# Patient Record
Sex: FEMALE | Race: WHITE | NOT HISPANIC OR LATINO | Employment: OTHER | URBAN - METROPOLITAN AREA
[De-identification: names, ages, dates, MRNs, and addresses within clinical notes are randomized per-mention and may not be internally consistent; named-entity substitution may affect disease eponyms.]

---

## 2017-08-11 ENCOUNTER — HOSPITAL ENCOUNTER (INPATIENT)
Facility: HOSPITAL | Age: 82
LOS: 1 days | Discharge: HOSPICE | DRG: 087 | End: 2017-08-12
Attending: EMERGENCY MEDICINE | Admitting: PSYCHIATRY & NEUROLOGY
Payer: MEDICARE

## 2017-08-11 DIAGNOSIS — S06.33AA CEREBRAL CONTUSION: Primary | ICD-10-CM

## 2017-08-11 DIAGNOSIS — S06.329A: ICD-10-CM
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
## 2017-08-11 PROBLEM — E11.9 TYPE 2 DIABETES MELLITUS WITHOUT COMPLICATION: Status: ACTIVE | Noted: 2017-08-11

## 2017-08-11 PROBLEM — I10 ESSENTIAL HYPERTENSION: Status: ACTIVE | Noted: 2017-08-11

## 2017-08-11 PROBLEM — I35.0 NONRHEUMATIC AORTIC VALVE STENOSIS: Status: ACTIVE | Noted: 2017-08-11

## 2017-08-11 PROBLEM — E07.9 THYROID DYSFUNCTION: Status: ACTIVE | Noted: 2017-08-11

## 2017-08-11 PROBLEM — F32.A DEPRESSION: Status: ACTIVE | Noted: 2017-08-11

## 2017-08-11 LAB
ABO + RH BLD: NORMAL
ALBUMIN SERPL BCP-MCNC: 3.1 G/DL
ALP SERPL-CCNC: 63 U/L
ALT SERPL W/O P-5'-P-CCNC: 8 U/L
ANION GAP SERPL CALC-SCNC: 14 MMOL/L
APTT BLDCRRT: <21 SEC
AST SERPL-CCNC: 18 U/L
BACTERIA #/AREA URNS AUTO: ABNORMAL /HPF
BASOPHILS # BLD AUTO: 0.04 K/UL
BASOPHILS NFR BLD: 0.5 %
BILIRUB SERPL-MCNC: 0.4 MG/DL
BILIRUB UR QL STRIP: NEGATIVE
BLD GP AB SCN CELLS X3 SERPL QL: NORMAL
BUN SERPL-MCNC: 20 MG/DL
CALCIUM SERPL-MCNC: 9.2 MG/DL
CHLORIDE SERPL-SCNC: 107 MMOL/L
CHOLEST/HDLC SERPL: 3.5 {RATIO}
CLARITY UR REFRACT.AUTO: ABNORMAL
CO2 SERPL-SCNC: 17 MMOL/L
COLOR UR AUTO: YELLOW
CREAT SERPL-MCNC: 1.5 MG/DL
DIFFERENTIAL METHOD: ABNORMAL
EOSINOPHIL # BLD AUTO: 0 K/UL
EOSINOPHIL NFR BLD: 0.1 %
ERYTHROCYTE [DISTWIDTH] IN BLOOD BY AUTOMATED COUNT: 14.2 %
EST. GFR  (AFRICAN AMERICAN): 34.6 ML/MIN/1.73 M^2
EST. GFR  (NON AFRICAN AMERICAN): 30 ML/MIN/1.73 M^2
GLUCOSE SERPL-MCNC: 183 MG/DL
GLUCOSE UR QL STRIP: ABNORMAL
HCT VFR BLD AUTO: 32.8 %
HDL/CHOLESTEROL RATIO: 28.7 %
HDLC SERPL-MCNC: 143 MG/DL
HDLC SERPL-MCNC: 41 MG/DL
HGB BLD-MCNC: 10.5 G/DL
HGB UR QL STRIP: ABNORMAL
HYALINE CASTS UR QL AUTO: 3 /LPF
INR PPP: 1
KETONES UR QL STRIP: ABNORMAL
LDLC SERPL CALC-MCNC: 86.2 MG/DL
LEUKOCYTE ESTERASE UR QL STRIP: ABNORMAL
LYMPHOCYTES # BLD AUTO: 0.8 K/UL
LYMPHOCYTES NFR BLD: 9.8 %
MCH RBC QN AUTO: 28.8 PG
MCHC RBC AUTO-ENTMCNC: 32 G/DL
MCV RBC AUTO: 90 FL
MICROSCOPIC COMMENT: ABNORMAL
MONOCYTES # BLD AUTO: 0.4 K/UL
MONOCYTES NFR BLD: 4.3 %
NEUTROPHILS # BLD AUTO: 7.3 K/UL
NEUTROPHILS NFR BLD: 85 %
NITRITE UR QL STRIP: NEGATIVE
NONHDLC SERPL-MCNC: 102 MG/DL
PH UR STRIP: 8 [PH] (ref 5–8)
PLATELET # BLD AUTO: 188 K/UL
PMV BLD AUTO: 10.7 FL
POCT GLUCOSE: 219 MG/DL (ref 70–110)
POTASSIUM SERPL-SCNC: 4.9 MMOL/L
PROT SERPL-MCNC: 7.3 G/DL
PROT UR QL STRIP: ABNORMAL
PROTHROMBIN TIME: 10.9 SEC
RBC # BLD AUTO: 3.65 M/UL
RBC #/AREA URNS AUTO: 4 /HPF (ref 0–4)
SODIUM SERPL-SCNC: 138 MMOL/L
SP GR UR STRIP: 1.01 (ref 1–1.03)
SQUAMOUS #/AREA URNS AUTO: 1 /HPF
T4 FREE SERPL-MCNC: 1.3 NG/DL
TRIGL SERPL-MCNC: 79 MG/DL
TSH SERPL DL<=0.005 MIU/L-ACNC: 0.21 UIU/ML
URN SPEC COLLECT METH UR: ABNORMAL
UROBILINOGEN UR STRIP-ACNC: NEGATIVE EU/DL
WBC # BLD AUTO: 8.59 K/UL
WBC #/AREA URNS AUTO: >100 /HPF (ref 0–5)

## 2017-08-11 PROCEDURE — 12000002 HC ACUTE/MED SURGE SEMI-PRIVATE ROOM

## 2017-08-11 PROCEDURE — 25000003 PHARM REV CODE 250: Performed by: PSYCHIATRY & NEUROLOGY

## 2017-08-11 PROCEDURE — 93005 ELECTROCARDIOGRAM TRACING: CPT

## 2017-08-11 PROCEDURE — 81001 URINALYSIS AUTO W/SCOPE: CPT

## 2017-08-11 PROCEDURE — 85730 THROMBOPLASTIN TIME PARTIAL: CPT

## 2017-08-11 PROCEDURE — 25000003 PHARM REV CODE 250: Performed by: PHYSICIAN ASSISTANT

## 2017-08-11 PROCEDURE — 99223 1ST HOSP IP/OBS HIGH 75: CPT | Mod: AI,GC,, | Performed by: PSYCHIATRY & NEUROLOGY

## 2017-08-11 PROCEDURE — 96365 THER/PROPH/DIAG IV INF INIT: CPT | Mod: XS

## 2017-08-11 PROCEDURE — 84439 ASSAY OF FREE THYROXINE: CPT

## 2017-08-11 PROCEDURE — 63600175 PHARM REV CODE 636 W HCPCS: Performed by: PSYCHIATRY & NEUROLOGY

## 2017-08-11 PROCEDURE — 63600175 PHARM REV CODE 636 W HCPCS: Performed by: PHYSICIAN ASSISTANT

## 2017-08-11 PROCEDURE — 85025 COMPLETE CBC W/AUTO DIFF WBC: CPT

## 2017-08-11 PROCEDURE — 86900 BLOOD TYPING SEROLOGIC ABO: CPT

## 2017-08-11 PROCEDURE — 83036 HEMOGLOBIN GLYCOSYLATED A1C: CPT

## 2017-08-11 PROCEDURE — 85610 PROTHROMBIN TIME: CPT

## 2017-08-11 PROCEDURE — 96372 THER/PROPH/DIAG INJ SC/IM: CPT

## 2017-08-11 PROCEDURE — 99285 EMERGENCY DEPT VISIT HI MDM: CPT | Mod: 25

## 2017-08-11 PROCEDURE — 93010 ELECTROCARDIOGRAM REPORT: CPT | Mod: ,,, | Performed by: INTERNAL MEDICINE

## 2017-08-11 PROCEDURE — 87086 URINE CULTURE/COLONY COUNT: CPT

## 2017-08-11 PROCEDURE — 96368 THER/DIAG CONCURRENT INF: CPT

## 2017-08-11 PROCEDURE — A4216 STERILE WATER/SALINE, 10 ML: HCPCS | Performed by: PSYCHIATRY & NEUROLOGY

## 2017-08-11 PROCEDURE — 96375 TX/PRO/DX INJ NEW DRUG ADDON: CPT | Mod: XS

## 2017-08-11 PROCEDURE — 80053 COMPREHEN METABOLIC PANEL: CPT

## 2017-08-11 PROCEDURE — 99223 1ST HOSP IP/OBS HIGH 75: CPT | Mod: ,,, | Performed by: PHYSICIAN ASSISTANT

## 2017-08-11 PROCEDURE — 80061 LIPID PANEL: CPT

## 2017-08-11 PROCEDURE — 96366 THER/PROPH/DIAG IV INF ADDON: CPT

## 2017-08-11 PROCEDURE — 82962 GLUCOSE BLOOD TEST: CPT

## 2017-08-11 PROCEDURE — 86901 BLOOD TYPING SEROLOGIC RH(D): CPT

## 2017-08-11 PROCEDURE — 84443 ASSAY THYROID STIM HORMONE: CPT

## 2017-08-11 PROCEDURE — 99291 CRITICAL CARE FIRST HOUR: CPT | Mod: ,,, | Performed by: EMERGENCY MEDICINE

## 2017-08-11 RX ORDER — INSULIN GLARGINE 100 [IU]/ML
25 INJECTION, SOLUTION SUBCUTANEOUS NIGHTLY
COMMUNITY

## 2017-08-11 RX ORDER — HYDROCODONE BITARTRATE AND ACETAMINOPHEN 5; 325 MG/1; MG/1
1 TABLET ORAL EVERY 6 HOURS PRN
COMMUNITY

## 2017-08-11 RX ORDER — FUROSEMIDE 20 MG/1
20 TABLET ORAL EVERY OTHER DAY
Status: DISCONTINUED | OUTPATIENT
Start: 2017-08-12 | End: 2017-08-12 | Stop reason: HOSPADM

## 2017-08-11 RX ORDER — METOPROLOL TARTRATE 50 MG/1
50 TABLET ORAL 2 TIMES DAILY
Status: DISCONTINUED | OUTPATIENT
Start: 2017-08-11 | End: 2017-08-12 | Stop reason: HOSPADM

## 2017-08-11 RX ORDER — NICARDIPINE HYDROCHLORIDE 0.2 MG/ML
5 INJECTION INTRAVENOUS CONTINUOUS
Status: DISCONTINUED | OUTPATIENT
Start: 2017-08-11 | End: 2017-08-12

## 2017-08-11 RX ORDER — SODIUM CHLORIDE 0.9 % (FLUSH) 0.9 %
3 SYRINGE (ML) INJECTION EVERY 8 HOURS
Status: DISCONTINUED | OUTPATIENT
Start: 2017-08-11 | End: 2017-08-12 | Stop reason: HOSPADM

## 2017-08-11 RX ORDER — FERROUS SULFATE 325(65) MG
325 TABLET ORAL
COMMUNITY

## 2017-08-11 RX ORDER — ONDANSETRON 2 MG/ML
4 INJECTION INTRAMUSCULAR; INTRAVENOUS
Status: COMPLETED | OUTPATIENT
Start: 2017-08-11 | End: 2017-08-11

## 2017-08-11 RX ORDER — FUROSEMIDE 20 MG/1
20 TABLET ORAL EVERY OTHER DAY
COMMUNITY

## 2017-08-11 RX ORDER — LABETALOL HYDROCHLORIDE 5 MG/ML
10 INJECTION, SOLUTION INTRAVENOUS EVERY 6 HOURS PRN
Status: DISCONTINUED | OUTPATIENT
Start: 2017-08-11 | End: 2017-08-12 | Stop reason: HOSPADM

## 2017-08-11 RX ORDER — CITALOPRAM 10 MG/1
10 TABLET ORAL DAILY
Status: DISCONTINUED | OUTPATIENT
Start: 2017-08-11 | End: 2017-08-12 | Stop reason: HOSPADM

## 2017-08-11 RX ORDER — ONDANSETRON 4 MG/1
4 TABLET, FILM COATED ORAL EVERY 8 HOURS PRN
COMMUNITY

## 2017-08-11 RX ORDER — HYDRALAZINE HYDROCHLORIDE 25 MG/1
25 TABLET, FILM COATED ORAL 3 TIMES DAILY
COMMUNITY

## 2017-08-11 RX ORDER — LOSARTAN POTASSIUM 100 MG/1
100 TABLET ORAL DAILY
COMMUNITY

## 2017-08-11 RX ORDER — PANTOPRAZOLE SODIUM 40 MG/1
40 TABLET, DELAYED RELEASE ORAL DAILY
Status: DISCONTINUED | OUTPATIENT
Start: 2017-08-11 | End: 2017-08-12 | Stop reason: HOSPADM

## 2017-08-11 RX ORDER — CITALOPRAM 10 MG/1
10 TABLET ORAL DAILY
COMMUNITY

## 2017-08-11 RX ORDER — NICARDIPINE HYDROCHLORIDE 0.2 MG/ML
5 INJECTION INTRAVENOUS CONTINUOUS
Status: DISCONTINUED | OUTPATIENT
Start: 2017-08-11 | End: 2017-08-11

## 2017-08-11 RX ORDER — LEVOTHYROXINE SODIUM 125 UG/1
125 TABLET ORAL DAILY
Status: ON HOLD | COMMUNITY
End: 2017-10-17 | Stop reason: HOSPADM

## 2017-08-11 RX ORDER — SIMVASTATIN 20 MG/1
20 TABLET, FILM COATED ORAL NIGHTLY
COMMUNITY

## 2017-08-11 RX ORDER — LEVETIRACETAM 10 MG/ML
1000 INJECTION INTRAVASCULAR ONCE
Status: COMPLETED | OUTPATIENT
Start: 2017-08-11 | End: 2017-08-11

## 2017-08-11 RX ORDER — AMOXICILLIN 250 MG
1 CAPSULE ORAL 2 TIMES DAILY
Status: DISCONTINUED | OUTPATIENT
Start: 2017-08-11 | End: 2017-08-12 | Stop reason: HOSPADM

## 2017-08-11 RX ORDER — HYDRALAZINE HYDROCHLORIDE 20 MG/ML
10 INJECTION INTRAMUSCULAR; INTRAVENOUS EVERY 8 HOURS PRN
Status: DISCONTINUED | OUTPATIENT
Start: 2017-08-11 | End: 2017-08-12

## 2017-08-11 RX ORDER — HYDRALAZINE HYDROCHLORIDE 25 MG/1
25 TABLET, FILM COATED ORAL 3 TIMES DAILY
Status: DISCONTINUED | OUTPATIENT
Start: 2017-08-11 | End: 2017-08-12 | Stop reason: HOSPADM

## 2017-08-11 RX ORDER — SIMVASTATIN 10 MG/1
20 TABLET, FILM COATED ORAL NIGHTLY
Status: DISCONTINUED | OUTPATIENT
Start: 2017-08-11 | End: 2017-08-12 | Stop reason: HOSPADM

## 2017-08-11 RX ORDER — GLUCAGON 1 MG
1 KIT INJECTION
Status: DISCONTINUED | OUTPATIENT
Start: 2017-08-11 | End: 2017-08-12 | Stop reason: HOSPADM

## 2017-08-11 RX ORDER — FERROUS SULFATE 325(65) MG
325 TABLET, DELAYED RELEASE (ENTERIC COATED) ORAL DAILY
Status: DISCONTINUED | OUTPATIENT
Start: 2017-08-11 | End: 2017-08-12 | Stop reason: HOSPADM

## 2017-08-11 RX ORDER — INSULIN ASPART 100 [IU]/ML
0-5 INJECTION, SOLUTION INTRAVENOUS; SUBCUTANEOUS EVERY 6 HOURS PRN
Status: DISCONTINUED | OUTPATIENT
Start: 2017-08-11 | End: 2017-08-12 | Stop reason: HOSPADM

## 2017-08-11 RX ORDER — LEVOTHYROXINE SODIUM 125 UG/1
125 TABLET ORAL DAILY
Status: DISCONTINUED | OUTPATIENT
Start: 2017-08-11 | End: 2017-08-12 | Stop reason: HOSPADM

## 2017-08-11 RX ORDER — OMEPRAZOLE 40 MG/1
40 CAPSULE, DELAYED RELEASE ORAL DAILY
COMMUNITY

## 2017-08-11 RX ORDER — HYDRALAZINE HYDROCHLORIDE 20 MG/ML
10 INJECTION INTRAMUSCULAR; INTRAVENOUS EVERY 8 HOURS PRN
Status: DISCONTINUED | OUTPATIENT
Start: 2017-08-11 | End: 2017-08-11

## 2017-08-11 RX ORDER — LOSARTAN POTASSIUM 50 MG/1
100 TABLET ORAL DAILY
Status: DISCONTINUED | OUTPATIENT
Start: 2017-08-11 | End: 2017-08-12 | Stop reason: HOSPADM

## 2017-08-11 RX ORDER — NITROFURANTOIN 25; 75 MG/1; MG/1
100 CAPSULE ORAL 2 TIMES DAILY
COMMUNITY
End: 2018-02-16

## 2017-08-11 RX ORDER — ONDANSETRON 2 MG/ML
4 INJECTION INTRAMUSCULAR; INTRAVENOUS EVERY 12 HOURS PRN
Status: DISCONTINUED | OUTPATIENT
Start: 2017-08-11 | End: 2017-08-12 | Stop reason: HOSPADM

## 2017-08-11 RX ORDER — SODIUM CHLORIDE 9 MG/ML
INJECTION, SOLUTION INTRAVENOUS CONTINUOUS
Status: DISCONTINUED | OUTPATIENT
Start: 2017-08-11 | End: 2017-08-12

## 2017-08-11 RX ORDER — METOPROLOL TARTRATE 50 MG/1
50 TABLET ORAL 2 TIMES DAILY
COMMUNITY

## 2017-08-11 RX ORDER — LEVETIRACETAM 10 MG/ML
1000 INJECTION INTRAVASCULAR EVERY 12 HOURS
Status: DISCONTINUED | OUTPATIENT
Start: 2017-08-12 | End: 2017-08-12

## 2017-08-11 RX ORDER — HYDROCODONE BITARTRATE AND ACETAMINOPHEN 5; 325 MG/1; MG/1
1 TABLET ORAL EVERY 6 HOURS PRN
Status: DISCONTINUED | OUTPATIENT
Start: 2017-08-11 | End: 2017-08-12 | Stop reason: HOSPADM

## 2017-08-11 RX ORDER — LABETALOL HYDROCHLORIDE 5 MG/ML
10 INJECTION, SOLUTION INTRAVENOUS
Status: COMPLETED | OUTPATIENT
Start: 2017-08-11 | End: 2017-08-11

## 2017-08-11 RX ADMIN — LABETALOL HYDROCHLORIDE 10 MG: 5 INJECTION INTRAVENOUS at 01:08

## 2017-08-11 RX ADMIN — LEVETIRACETAM 1000 MG: 10 INJECTION INTRAVENOUS at 04:08

## 2017-08-11 RX ADMIN — ONDANSETRON 4 MG: 2 INJECTION INTRAMUSCULAR; INTRAVENOUS at 01:08

## 2017-08-11 RX ADMIN — SODIUM CHLORIDE 250 ML: 0.9 INJECTION, SOLUTION INTRAVENOUS at 04:08

## 2017-08-11 RX ADMIN — NICARDIPINE HYDROCHLORIDE 5 MG/HR: 0.2 INJECTION, SOLUTION INTRAVENOUS at 01:08

## 2017-08-11 RX ADMIN — SODIUM CHLORIDE: 0.9 INJECTION, SOLUTION INTRAVENOUS at 06:08

## 2017-08-11 RX ADMIN — Medication 3 ML: at 10:08

## 2017-08-11 RX ADMIN — NICARDIPINE HYDROCHLORIDE 5 MG/HR: 0.2 INJECTION, SOLUTION INTRAVENOUS at 03:08

## 2017-08-11 RX ADMIN — NICARDIPINE HYDROCHLORIDE 5 MG/HR: 0.2 INJECTION, SOLUTION INTRAVENOUS at 09:08

## 2017-08-11 RX ADMIN — INSULIN DETEMIR 13 UNITS: 100 INJECTION, SOLUTION SUBCUTANEOUS at 09:08

## 2017-08-11 NOTE — ASSESSMENT & PLAN NOTE
- fall of unknown cause at NH; will get echo, U/A and CXR to rule out these causes  - CT head at OSH showing small left contusion vs. Small left SAH  - pt altered and confused with no other focal neuro deficits  - pending repeat CT head  - will start Keppra for seizure prophylaxis for 7 days  - SBP goal <160  - NPO until passes swallow study; normal saline @ 50/hr to avoid overload 2/2 aortic dysfunction

## 2017-08-11 NOTE — PROGRESS NOTES
[facility]  Neurocritical Care  Progress Note    Admit Date: 8/11/2017  Service Date: 08/11/2017  Length of Stay: 0    Subjective:     Chief Complaint: <principal problem not specified>    History of Present Illness: 89F with a past medical history of HTN, DM, severe aortic stenosis, thyroid disease, dementia, HLD, GERD and depression who presented to [facility] after an unwitnessed wall at her nursing home. No laceration or external trauma noted by the family but per pt's daughter the pt began to be more altered that her baseline. At baseline she is often not oriented to time or place but if usually fluent and conversant. Currently the patient only mumbles and repeats a few words. She follows some commands but is drowsy and less conversant that usual. Pt had a CT of her head done that showed possible subdural, although when viewed here appeared more like a contusion vs. small SAH. INR 1.0. Pt takes ASA 81 at home, last dose was yesterday. She is not only any blood thinners.     Hospital Course: No notes on file      Review of Systems   Constitutional: Positive for activity change. Negative for fever.   Respiratory: Negative for apnea and shortness of breath.    Cardiovascular: Negative for chest pain.   Gastrointestinal: Positive for abdominal pain.       Objective:     Vitals:  Temp: 98 °F (36.7 °C) (08/11/17 1500)  Pulse: 83 (08/11/17 1504)  Resp: 20 (08/11/17 1422)  BP: (!) 147/67 (08/11/17 1504)  SpO2: 96 % (08/11/17 1451)    Temp:  [98 °F (36.7 °C)] 98 °F (36.7 °C)  Pulse:  [77-83] 83  Resp:  [16-28] 20  SpO2:  [96 %-99 %] 96 %  BP: (132-198)/(67-92) 147/67              No intake/output data recorded.    Physical Exam   Constitutional: She appears well-developed and well-nourished.   HENT:   Head: Normocephalic and atraumatic.   Eyes: EOM are normal. Pupils are equal, round, and reactive to light.   Neck: Normal range of motion.   Cardiovascular: Normal rate and regular rhythm.     Murmur heard.  Pulmonary/Chest: Breath sounds normal.   Abdominal: Soft. There is no tenderness.   Neurological: She is alert. She has normal strength and normal reflexes. She is disoriented. No cranial nerve deficit or sensory deficit.       Medications:  Continuous  nicardipine Last Rate: 5 mg/hr (08/11/17 1503)   sodium chloride 0.9%    Scheduled  citalopram 10 mg Daily   ferrous sulfate 325 mg Daily   [START ON 8/12/2017] furosemide 20 mg Every other day   hydrALAZINE 25 mg TID   insulin detemir 13 Units QHS   levetiracetam IVPB 1,000 mg Once   [START ON 8/12/2017] levetiracetam IVPB 1,000 mg Q12H   levothyroxine 125 mcg Daily   losartan 100 mg Daily   metoprolol tartrate 50 mg BID   pantoprazole 40 mg Daily   senna-docusate 8.6-50 mg 1 tablet BID   simvastatin 20 mg QHS   sodium chloride 0.9% 250 mL Once   sodium chloride 0.9% 3 mL Q8H   PRN  hydrocodone-acetaminophen 5-325mg 1 tablet Q6H PRN   ondansetron 4 mg Q12H PRN   sodium chloride 0.9% 250 mL Continuous PRN     Today I personally reviewed pertinent medications, lines/drains/airways, imaging, cardiology, lab results, microbiology results, notably:        Assessment/Plan:     Neuro   Cerebral contusion    - fall of unknown cause at NH; will get echo, U/A and CXR to rule out these causes  - CT head at OSH showing small left contusion vs. Small left SAH  - pt altered and confused with no other focal neuro deficits  - pending repeat CT head  - will start Keppra for seizure prophylaxis for 7 days  - SBP goal <160  - NPO until passes swallow study; normal saline @ 50/hr to avoid overload 2/2 aortic dysfunction        Psychiatric   Depression    - cont citalopram 10 mg qhs        Cardiac/Vascular   Nonrheumatic aortic valve stenosis    - no acute issues        Essential hypertension    - BP in the 200s/90s on admit  - placed on cardene drip in the ED, will cont and admit to ICU for monitoring  - will restart home BP meds and titrate off cardene           Endocrine   Thyroid dysfunction    - cont home synthroid 125 mcg daily        Type 2 diabetes mellitus without complication    - Glu 183 on admit  - on Lantus 25 units qHS at home, pt currently NPO, will order 13 units for now and increase to home dose once eating again  - SSI and accuchecks              Prophylaxis:  Venous Thromboembolism: mechanical  Stress Ulcer: None  Ventilator Pneumonia: no     Activity Orders          None        DNR    Jenn Huddleston MD  Neurocritical Care  [facility]

## 2017-08-11 NOTE — ED NOTES
"Upon admit, patient saturated with urine, excoriated perineum and coccyx, patient states " O GoD" over and over, began vomiting yellow with HOB immed elevated and suction oral cavity. Informed physician Inocente and linen changed for formed BM  "

## 2017-08-11 NOTE — CONSULTS
"[facility]  Neurosurgery  Consult Note    Consults  Subjective:     Chief Complaint/Reason for Admission: Head bleed    History of Present Illness: Ms. Eugene is a 89F with PMHx HTN, DM, severe aortic stenosis, thyroid disease, dementia, HLD, GERD and depression who presented to [facility] after an unwitnessed wall at her assisted living facility.  She was taken to [facility], a CT Head was done that showed "subdural hematoma", and she was transferred to [facility] for neurosurgical evaluation.  Daughter is bedside and gives history due to patient's AMS.  The daughter states the patient is not at her baseline.  She is usually conversant and follows commands, answers most questions appropriately. The patient takes ASA 81 daily and last had a dose yesterday. She is currently mumbling and not following commands, which is not usual for her per daughter.          Review of patient's allergies indicates:   Allergen Reactions    Devante-1     Codeine     Novocain [procaine]        Past Medical History:   Diagnosis Date    Dementia     Depression     Diabetes mellitus     GERD (gastroesophageal reflux disease)     Hyperlipidemia     Hypertension     Thyroid disease      Past Surgical History:   Procedure Laterality Date    APPENDECTOMY      CHOLECYSTECTOMY       Family History     None        Social History Main Topics    Smoking status: Never Smoker    Smokeless tobacco: Never Used    Alcohol use No    Drug use: No    Sexual activity: Not on file     Review of Systems   Unable to obtain secondary to patient's mental status    Objective:     Weight: 54.4 kg (119 lb 14.9 oz)  Body mass index is 21.94 kg/m².  Vital Signs (Most Recent):  Temp: 98 °F (36.7 °C) (08/11/17 1500)  Pulse: 80 (08/11/17 1900)  Resp: 14 (08/11/17 1900)  BP: 126/81 (08/11/17 1900)  SpO2: 97 % (08/11/17 1900) Vital Signs (24h Range):  Temp:  [98 °F (36.7 °C)] 98 °F (36.7 °C)  Pulse:  [77-84] 80  Resp:  [14-28] " 14  SpO2:  [95 %-99 %] 97 %  BP: (126-198)/(62-92) 126/81          Neurosurgery Physical Exam   General: well developed, well nourished, no distress  Head: normocephalic, atraumatic  Neurologic: Alert, mumbling  GCS: Motor: 5/Verbal: 2/Eyes: 4 GCS Total: 11  Mental Status: Awake, drowsy  Language: No aphasia  Speech: No dysarthria  Cranial nerves: face symmetric, tongue midline, CN II-XII grossly intact.   Eyes: pupils equal, round, reactive to light with accommodation, EOMI  Pulmonary: normal respirations, not labored, no accessory muscles used  Abdomen: soft, non-distended, not tender to palpation  Sensory: intact to light touch throughout  Motor Strength: Moves all extremities spontaneously with good tone.  No abnormal movements seen.   Pronator Drift: unable to assess  Finger-to-nose: unable to assess  Barney: absent  Clonus: absent  Babinski: absent  Pulses: 2+ and symmetric radial and dorsalis pedis  Skin: warm, dry and intact, no rashes  No neck pain with palpation  No visible external trauma      Significant Labs:    Recent Labs  Lab 08/11/17  1402   *      K 4.9      CO2 17*   BUN 20   CREATININE 1.5*   CALCIUM 9.2       Recent Labs  Lab 08/11/17  1402   WBC 8.59   HGB 10.5*   HCT 32.8*          Recent Labs  Lab 08/11/17  1402   INR 1.0   APTT <21.0     Microbiology Results (last 7 days)     Procedure Component Value Units Date/Time    Urine culture [[id number]] Collected:  08/11/17 1513    Order Status:  No result Specimen:  Urine Updated:  08/11/17 1559          Significant Diagnostics:  I personally reviewed [facility] CT Head - small left temporal contusions, no subdural hematoma, no mass effect or midline shift    Assessment/Plan:     Cerebral contusion    Ms. Eugene is a 89yoF s/p fall with small left temporal contusion  -Admit to [facility]  -Neuro checks q1h  -Repeat CTH now  -SBP <140  -Goal eunatremia  -Keppra for seizure prophylaxis  -Discussed with Dr. Taylor            Thank you  for your consult. I will follow-up with patient. Please contact us if you have any additional questions.    Margaux Wilson PA-C  Neurosurgery  [facility]

## 2017-08-11 NOTE — SUBJECTIVE & OBJECTIVE
Review of Systems   Constitutional: Positive for activity change. Negative for fever.   Respiratory: Negative for apnea and shortness of breath.    Cardiovascular: Negative for chest pain.   Gastrointestinal: Positive for abdominal pain.       Objective:     Vitals:  Temp: 98 °F (36.7 °C) (08/11/17 1500)  Pulse: 83 (08/11/17 1504)  Resp: 20 (08/11/17 1422)  BP: (!) 147/67 (08/11/17 1504)  SpO2: 96 % (08/11/17 1451)    Temp:  [98 °F (36.7 °C)] 98 °F (36.7 °C)  Pulse:  [77-83] 83  Resp:  [16-28] 20  SpO2:  [96 %-99 %] 96 %  BP: (132-198)/(67-92) 147/67              No intake/output data recorded.    Physical Exam   Constitutional: She appears well-developed and well-nourished.   HENT:   Head: Normocephalic and atraumatic.   Eyes: EOM are normal. Pupils are equal, round, and reactive to light.   Neck: Normal range of motion.   Cardiovascular: Normal rate and regular rhythm.    Murmur heard.  Pulmonary/Chest: Breath sounds normal.   Abdominal: Soft. There is no tenderness.   Neurological: She is alert. She has normal strength and normal reflexes. She is disoriented. No cranial nerve deficit or sensory deficit.       Medications:  Continuous  nicardipine Last Rate: 5 mg/hr (08/11/17 1503)   sodium chloride 0.9%    Scheduled  citalopram 10 mg Daily   ferrous sulfate 325 mg Daily   [START ON 8/12/2017] furosemide 20 mg Every other day   hydrALAZINE 25 mg TID   insulin detemir 13 Units QHS   levetiracetam IVPB 1,000 mg Once   [START ON 8/12/2017] levetiracetam IVPB 1,000 mg Q12H   levothyroxine 125 mcg Daily   losartan 100 mg Daily   metoprolol tartrate 50 mg BID   pantoprazole 40 mg Daily   senna-docusate 8.6-50 mg 1 tablet BID   simvastatin 20 mg QHS   sodium chloride 0.9% 250 mL Once   sodium chloride 0.9% 3 mL Q8H   PRN  hydrocodone-acetaminophen 5-325mg 1 tablet Q6H PRN   ondansetron 4 mg Q12H PRN   sodium chloride 0.9% 250 mL Continuous PRN     Today I personally reviewed pertinent medications, lines/drains/airways,  imaging, cardiology, lab results, microbiology results, notably:

## 2017-08-11 NOTE — ASSESSMENT & PLAN NOTE
- BP in the 200s/90s on admit  - placed on cardene drip in the ED, will cont and admit to ICU for monitoring  - will restart home BP meds and titrate off cardene

## 2017-08-11 NOTE — ED NOTES
"Patient able to follow simple commands,ie, uncrosses ankles,  states "Oh my" over and over. Difficult to preform neuro assessment, unable to complete sentences.   "

## 2017-08-11 NOTE — HPI
89F with a past medical history of HTN, DM, severe aortic stenosis, thyroid disease, dementia, HLD, GERD and depression who presented to [facility] after an unwitnessed fall at her nursing home. No laceration or external trauma noted by the family but per pt's daughter the pt began to be more altered that her baseline. At baseline she is often not oriented to time or place but if usually fluent and conversant. Currently the patient only mumbles and repeats a few words. She follows some commands but is drowsy and less conversant that usual. Pt had a CT of her head done that showed possible subdural, although when viewed here appeared more like a contusion vs. small SAH. INR 1.0. Pt takes ASA 81 at home, last dose was yesterday. She is not only any blood thinners.

## 2017-08-11 NOTE — ED TRIAGE NOTES
"Patient from [facility], fell this am, sent to [facility], transferred to [facility] due to "head bleed" per EMS,Sent with CD, results of mult labs and x rays from 0930 this am.  "

## 2017-08-11 NOTE — ED PROVIDER NOTES
Encounter Date: 8/11/2017    SCRIBE #1 NOTE: I, Lilliana Arellano, am scribing for, and in the presence of,  Dr. Dawn. I have scribed the following portions of the note - the APC attestation and the EKG reading.       History     Chief Complaint   Patient presents with    transfer from [facility]; fell out of wheelchair this morning from nursing home; now altered     89-year-old female with HTN, DM, thyroid disease, dementia, HLD, GERD, depression presents as a transfer from [facility] presents to the ED for further evaluation of head trauma.  Imaging revealed hemorrhagic cortical contusions.  Patient had a fall from her wheelchair at her residence at a nursing facility.  She was noted to have altered mental status.  Per daughter, patient is able to carry on normal conversations, ambulates normally and is independent.           Review of patient's allergies indicates:   Allergen Reactions    Devante-1     Codeine     Novocain [procaine]      Past Medical History:   Diagnosis Date    Dementia     Depression     Diabetes mellitus     GERD (gastroesophageal reflux disease)     Hyperlipidemia     Hypertension     Thyroid disease      Past Surgical History:   Procedure Laterality Date    APPENDECTOMY      CHOLECYSTECTOMY       History reviewed. No pertinent family history.  Social History   Substance Use Topics    Smoking status: Never Smoker    Smokeless tobacco: Never Used    Alcohol use No     Review of Systems   Unable to perform ROS: Mental status change       Physical Exam     Initial Vitals [08/11/17 1245]   BP Pulse Resp Temp SpO2   (!) 198/92 78 16 98 °F (36.7 °C) 98 %      MAP       127.33         Physical Exam    Nursing note and vitals reviewed.  Constitutional: She appears well-developed and well-nourished. She is not diaphoretic.  Non-toxic appearance. She does not appear ill. No distress.   HENT:   Head: Normocephalic and atraumatic.   Eyes: Conjunctivae and EOM are normal. Pupils  are equal, round, and reactive to light.   Neck: Neck supple.   Cardiovascular: Normal rate and regular rhythm. Exam reveals no gallop and no friction rub.    No murmur heard.  Pulmonary/Chest: Effort normal and breath sounds normal. No accessory muscle usage. No tachypnea. No respiratory distress. She has no decreased breath sounds. She has no wheezes. She has no rhonchi. She has no rales.   Abdominal: Normal appearance. She exhibits no distension.   Musculoskeletal: Normal range of motion.   Neurological: She is alert. GCS eye subscore is 4.   Patient spontaneously moving upper and lower extremities. Limited neuro exam 2/2 mental status   Skin: Skin is warm and dry. No rash noted. No pallor.   Psychiatric:   Mumbled incoherent speech         ED Course   Procedures  Labs Reviewed   CBC W/ AUTO DIFFERENTIAL - Abnormal; Notable for the following:        Result Value    RBC 3.65 (*)     Hemoglobin 10.5 (*)     Hematocrit 32.8 (*)     Lymph # 0.8 (*)     Gran% 85.0 (*)     Lymph% 9.8 (*)     All other components within normal limits   COMPREHENSIVE METABOLIC PANEL - Abnormal; Notable for the following:     CO2 17 (*)     Glucose 183 (*)     Creatinine 1.5 (*)     Albumin 3.1 (*)     ALT 8 (*)     eGFR if  34.6 (*)     eGFR if non  30.0 (*)     All other components within normal limits   URINALYSIS, REFLEX TO URINE CULTURE - Abnormal; Notable for the following:     Appearance, UA Cloudy (*)     Protein, UA 2+ (*)     Glucose, UA 1+ (*)     Ketones, UA 1+ (*)     Occult Blood UA 1+ (*)     Leukocytes, UA 3+ (*)     All other components within normal limits    Narrative:     Via pure wick   URINALYSIS MICROSCOPIC - Abnormal; Notable for the following:     WBC, UA >100 (*)     Bacteria, UA Moderate (*)     Hyaline Casts, UA 3 (*)     All other components within normal limits    Narrative:     Via pure wick   TSH - Abnormal; Notable for the following:     TSH 0.208 (*)     All other  components within normal limits    Narrative:     ADD ON HEMOGLOBIN A1C ORDER #[id number] PER DR. RAIZA DAWN,    08/11/2017  15:58   ADD ON APTT ORDER #[id number] PER PER DR. ARIZA DAWN,    08/11/2017  15:58  add on TSH order #[id number] and  Lipid panel order #[id number] per Dr. Raiza Dawn @ 16:09  08/11/2017    CULTURE, URINE   PROTIME-INR   HEMOGLOBIN A1C   LIPID PANEL   TSH   APTT   HEMOGLOBIN A1C   APTT    Narrative:     ADD ON HEMOGLOBIN A1C ORDER #[id number] PER DR. RAIZA DAWN,    08/11/2017  15:58   ADD ON APTT ORDER #[id number] PER PER DR. RAIZA DAWN,    08/11/2017  15:58  add on TSH order #[id number] and  Lipid panel order #[id number] per Dr. Raiza Dawn @ 16:09  08/11/2017    LIPID PANEL    Narrative:     ADD ON HEMOGLOBIN A1C ORDER #[id number] PER DR. RAIZA DAWN,    08/11/2017  15:58   ADD ON APTT ORDER #[id number] PER PER DR. RAIZA DAWN,    08/11/2017  15:58  add on TSH order #[id number] and  Lipid panel order #[id number] per Dr. Raiza Dawn @ 16:09  08/11/2017    T4, FREE    Narrative:     ADD ON HEMOGLOBIN A1C ORDER #[id number] PER DR. RAIZA DAWN,    08/11/2017  15:58   ADD ON APTT ORDER #[id number] PER PER DR. RAIZA DAWN,    08/11/2017  15:58  add on TSH order #[id number] and  Lipid panel order #[id number] per Dr. Raiza Dawn @ 16:09  08/11/2017    HEMOGLOBIN A1C   TYPE & SCREEN   POCT GLUCOSE MONITORING CONTINUOUS     EKG Readings: (Independently Interpreted)   Rhythm: Normal Sinus Rhythm. Heart Rate: 85 bpm.   ST depression in V5 and V6. T wave inversion anteriorly. No previous EKG available.          Medical Decision Making:   History:   Old Medical Records: I decided to obtain old medical records.  Independently Interpreted Test(s):   I have ordered and independently interpreted EKG Reading(s) - see prior notes  Clinical Tests:   Lab Tests: Ordered and Reviewed  Medical Tests: Ordered and Reviewed       APC / Resident Notes:   89-year-old female  presents as a transfer for further evaluation of cerebral contusion after traumatic head injury with altered mental status.  Patient is mumbling incoherently.  She is spontaneously moving all 4 extremities.  Unable to follow commands.    Neurosurgery and neuro critical care were consulted.  BP required control with labetalol and eventually Cardene.  Patient admitted to neuro critical care for further management.  I have reviewed the patient's records and discussed this case with my supervising physician.       Scribe Attestation:   Scribe #1: I performed the above scribed service and the documentation accurately describes the services I performed. I attest to the accuracy of the note.    Attending Attestation:     Physician Attestation Statement for NP/PA:   I have conducted a face to face encounter with this patient in addition to the NP/PA, due to Medical Complexity    Other NP/PA Attestation Additions:    History of Present Illness: 89 y.o. woman transferred from outside facility after a fall, she presented with AMS and was found to have cerebral contusion. History is obtained from the daughter who tells me at her baseline patient is conversant and appropriate but has been mumbling and not making sense since the fall.    Physical Exam: Patient is holding an emesis bag, she is constantly mumbling incoherently, and she is unable to follow any of my commands.   Medical Decision Making: Patient was hypertensive, given a dose IV Labetalol and her BP did not respond. She was then started on IV Cardene. Neurosurgery and Neuro ICU consulted. Neuro ICU will admit patient.      Attending Critical Care:   Critical Care Times:   Direct Patient Care (initial evaluation, reassessments, and time considering the case)................................................................15 minutes.   Additional History from reviewing old medical records or taking additional history from the family, EMS, PCP, etc.......................5  minutes.   Ordering, Reviewing, and Interpreting Diagnostic Studies...............................................................................................................5 minutes.   Documentation..................................................................................................................................................................................5 minutes.   Consultation with other Physicians. .................................................................................................................................................5 minutes.   ==============================================================  · Total Critical Care Time - exclusive of procedural time: 35 minutes.  ==============================================================  Critical Care Condition: life-threatening   Critical Care Comments: Critical care time required in this patient with intracranial bleed and HTN requiring a Cardene drip. Patient had potential for deterioration in her condition at any time.     Physician Attestation for Scribe:  Physician Attestation Statement for Scribe #1: I, Dr. Dawn, reviewed documentation, as scribed by Lilliana Arellano in my presence, and it is both accurate and complete.                 ED Course     Clinical Impression:   The primary encounter diagnosis was Cerebral contusion. A diagnosis of Fall was also pertinent to this visit.    Disposition:   Disposition: Admitted  Condition: Serious                        Cathryn Pina PA-C  08/11/17 1938       Raiza Johnson MD  08/14/17 0569

## 2017-08-11 NOTE — ED NOTES
Patient again vomiting with immed suction and HOB elevated. Unable to completed sentences, moves all ext, not to command

## 2017-08-11 NOTE — ASSESSMENT & PLAN NOTE
- Glu 183 on admit  - on Lantus 25 units qHS at home, pt currently NPO, will order 13 units for now and increase to home dose once eating again  - SSI and accuchecks

## 2017-08-11 NOTE — ED NOTES
Patient identifiers verified and correct for Ms Eugene  C/C: AMS after fall  APPEARANCE: awake with eyes open  SKIN: warm, dry Bruising to right elbow. Perineum excoriated/red, incont of urine   MUSCULOSKELETAL: Patient moving all extremities spontaneously, Will follow simple commands to move ext, Uses wheelchair PTA  RESPIRATORY: Denies shortness of breath.Respirations unlabored.   CARDIAC: Denies CP, 2+ distal pulses; no peripheral edema  ABDOMEN:Abd soft, positive emesis and formed BM  : Incont of urine  Neurologic: Eyes open, unable to state name or date, noted speech gibberish, Negative drift, pupils 3 mm, reactive, moves all ext spon, wekness to RUE, unable to complete sentences, repeats self over and over

## 2017-08-12 VITALS
SYSTOLIC BLOOD PRESSURE: 111 MMHG | RESPIRATION RATE: 22 BRPM | BODY MASS INDEX: 21.9 KG/M2 | TEMPERATURE: 98 F | DIASTOLIC BLOOD PRESSURE: 72 MMHG | HEART RATE: 129 BPM | HEIGHT: 62 IN | WEIGHT: 119 LBS | OXYGEN SATURATION: 99 %

## 2017-08-12 LAB
ALBUMIN SERPL BCP-MCNC: 2.8 G/DL
ALP SERPL-CCNC: 50 U/L
ALT SERPL W/O P-5'-P-CCNC: 6 U/L
ANION GAP SERPL CALC-SCNC: 11 MMOL/L
AST SERPL-CCNC: 12 U/L
BACTERIA UR CULT: NO GROWTH
BASOPHILS # BLD AUTO: 0.03 K/UL
BASOPHILS NFR BLD: 0.4 %
BILIRUB SERPL-MCNC: 0.4 MG/DL
BUN SERPL-MCNC: 23 MG/DL
CALCIUM SERPL-MCNC: 8.5 MG/DL
CHLORIDE SERPL-SCNC: 109 MMOL/L
CO2 SERPL-SCNC: 19 MMOL/L
CREAT SERPL-MCNC: 1.5 MG/DL
DIFFERENTIAL METHOD: ABNORMAL
EOSINOPHIL # BLD AUTO: 0 K/UL
EOSINOPHIL NFR BLD: 0.4 %
ERYTHROCYTE [DISTWIDTH] IN BLOOD BY AUTOMATED COUNT: 14.4 %
EST. GFR  (AFRICAN AMERICAN): 34.6 ML/MIN/1.73 M^2
EST. GFR  (NON AFRICAN AMERICAN): 30 ML/MIN/1.73 M^2
ESTIMATED AVG GLUCOSE: 154 MG/DL
GLUCOSE SERPL-MCNC: 118 MG/DL
HBA1C MFR BLD HPLC: 7 %
HCT VFR BLD AUTO: 28.2 %
HGB BLD-MCNC: 9.2 G/DL
INR PPP: 1.1
LYMPHOCYTES # BLD AUTO: 2.3 K/UL
LYMPHOCYTES NFR BLD: 27.3 %
MAGNESIUM SERPL-MCNC: 1.7 MG/DL
MCH RBC QN AUTO: 28.8 PG
MCHC RBC AUTO-ENTMCNC: 32.6 G/DL
MCV RBC AUTO: 88 FL
MONOCYTES # BLD AUTO: 1.1 K/UL
MONOCYTES NFR BLD: 13.1 %
NEUTROPHILS # BLD AUTO: 4.9 K/UL
NEUTROPHILS NFR BLD: 58.6 %
PHOSPHATE SERPL-MCNC: 3.4 MG/DL
PLATELET # BLD AUTO: 199 K/UL
PMV BLD AUTO: 10.4 FL
POCT GLUCOSE: 110 MG/DL (ref 70–110)
POCT GLUCOSE: 87 MG/DL (ref 70–110)
POTASSIUM SERPL-SCNC: 3.9 MMOL/L
PROT SERPL-MCNC: 6.5 G/DL
PROTHROMBIN TIME: 11.4 SEC
RBC # BLD AUTO: 3.2 M/UL
SODIUM SERPL-SCNC: 139 MMOL/L
WBC # BLD AUTO: 8.32 K/UL

## 2017-08-12 PROCEDURE — 97165 OT EVAL LOW COMPLEX 30 MIN: CPT

## 2017-08-12 PROCEDURE — G8996 SWALLOW CURRENT STATUS: HCPCS | Mod: CI

## 2017-08-12 PROCEDURE — G8997 SWALLOW GOAL STATUS: HCPCS | Mod: CI

## 2017-08-12 PROCEDURE — 99239 HOSP IP/OBS DSCHRG MGMT >30: CPT | Mod: ,,, | Performed by: PHYSICIAN ASSISTANT

## 2017-08-12 PROCEDURE — 85610 PROTHROMBIN TIME: CPT

## 2017-08-12 PROCEDURE — 97535 SELF CARE MNGMENT TRAINING: CPT

## 2017-08-12 PROCEDURE — 27000221 HC OXYGEN, UP TO 24 HOURS

## 2017-08-12 PROCEDURE — 85025 COMPLETE CBC W/AUTO DIFF WBC: CPT

## 2017-08-12 PROCEDURE — 93306 TTE W/DOPPLER COMPLETE: CPT | Mod: 26,,, | Performed by: INTERNAL MEDICINE

## 2017-08-12 PROCEDURE — 97802 MEDICAL NUTRITION INDIV IN: CPT

## 2017-08-12 PROCEDURE — G8998 SWALLOW D/C STATUS: HCPCS | Mod: CI

## 2017-08-12 PROCEDURE — 94761 N-INVAS EAR/PLS OXIMETRY MLT: CPT

## 2017-08-12 PROCEDURE — 83735 ASSAY OF MAGNESIUM: CPT

## 2017-08-12 PROCEDURE — 84100 ASSAY OF PHOSPHORUS: CPT

## 2017-08-12 PROCEDURE — 97116 GAIT TRAINING THERAPY: CPT

## 2017-08-12 PROCEDURE — 92610 EVALUATE SWALLOWING FUNCTION: CPT

## 2017-08-12 PROCEDURE — 25000003 PHARM REV CODE 250: Performed by: PSYCHIATRY & NEUROLOGY

## 2017-08-12 PROCEDURE — 80053 COMPREHEN METABOLIC PANEL: CPT

## 2017-08-12 PROCEDURE — A4216 STERILE WATER/SALINE, 10 ML: HCPCS | Performed by: PSYCHIATRY & NEUROLOGY

## 2017-08-12 PROCEDURE — 63600175 PHARM REV CODE 636 W HCPCS: Performed by: PSYCHIATRY & NEUROLOGY

## 2017-08-12 PROCEDURE — 93306 TTE W/DOPPLER COMPLETE: CPT

## 2017-08-12 PROCEDURE — 97162 PT EVAL MOD COMPLEX 30 MIN: CPT

## 2017-08-12 RX ORDER — NITROFURANTOIN 25; 75 MG/1; MG/1
100 CAPSULE ORAL EVERY 12 HOURS
Status: DISCONTINUED | OUTPATIENT
Start: 2017-08-12 | End: 2017-08-12 | Stop reason: HOSPADM

## 2017-08-12 RX ORDER — LEVETIRACETAM 250 MG/1
250 TABLET ORAL 2 TIMES DAILY
Status: DISCONTINUED | OUTPATIENT
Start: 2017-08-12 | End: 2017-08-12 | Stop reason: HOSPADM

## 2017-08-12 RX ORDER — NITROFURANTOIN 25; 75 MG/1; MG/1
100 CAPSULE ORAL EVERY 12 HOURS
Status: DISCONTINUED | OUTPATIENT
Start: 2017-08-12 | End: 2017-08-12

## 2017-08-12 RX ADMIN — METOPROLOL TARTRATE 50 MG: 50 TABLET, FILM COATED ORAL at 08:08

## 2017-08-12 RX ADMIN — LOSARTAN POTASSIUM 100 MG: 50 TABLET, FILM COATED ORAL at 08:08

## 2017-08-12 RX ADMIN — STANDARDIZED SENNA CONCENTRATE AND DOCUSATE SODIUM 1 TABLET: 8.6; 5 TABLET, FILM COATED ORAL at 08:08

## 2017-08-12 RX ADMIN — LEVETIRACETAM 1000 MG: 10 INJECTION INTRAVENOUS at 08:08

## 2017-08-12 RX ADMIN — FERROUS SULFATE TAB EC 325 MG (65 MG FE EQUIVALENT) 325 MG: 325 (65 FE) TABLET DELAYED RESPONSE at 08:08

## 2017-08-12 RX ADMIN — HYDRALAZINE HYDROCHLORIDE 25 MG: 25 TABLET, FILM COATED ORAL at 01:08

## 2017-08-12 RX ADMIN — FUROSEMIDE 20 MG: 20 TABLET ORAL at 08:08

## 2017-08-12 RX ADMIN — LEVOTHYROXINE SODIUM 125 MCG: 125 TABLET ORAL at 06:08

## 2017-08-12 RX ADMIN — PANTOPRAZOLE SODIUM 40 MG: 40 TABLET, DELAYED RELEASE ORAL at 08:08

## 2017-08-12 RX ADMIN — CITALOPRAM HYDROBROMIDE 10 MG: 10 TABLET ORAL at 08:08

## 2017-08-12 RX ADMIN — Medication 3 ML: at 05:08

## 2017-08-12 RX ADMIN — HYDRALAZINE HYDROCHLORIDE 25 MG: 25 TABLET, FILM COATED ORAL at 05:08

## 2017-08-12 NOTE — PLAN OF CARE
Problem: Patient Care Overview  Goal: Plan of Care Review  POC reviewed with pt and pt's daughter at 0630. Both verbalized understanding. Questions and concerns addressed. No acute events overnight. Pt transported to and from CT this morning prior to shift change. VSS during transport. No complications experienced. Pt has become increasingly more conversant but still confused and disoriented to everything except for self. Pt passed MARVA this AM. PO meds given and swallowed with no problem. Pt progressing toward goals. Will continue to monitor. See flowsheets for full assessment and VS info

## 2017-08-12 NOTE — NURSING
Patient arrived to [facility] from ED via stretcher attached to monitor    Patients current symptoms include confusion    NCC called and spoke to Dr. Mujica to make him aware that patient has arrived to room 7074.

## 2017-08-12 NOTE — NURSING
Pt discharged home, escorted to daughters car via wheelchair. Discharge papers discussed and sent with pt and daughter, as well as prescription for Keppra. Daughter verbalized understanding of pt care regime.

## 2017-08-12 NOTE — H&P
[facility]  Neurocritical Care  H&P     Admit Date: 8/11/2017  Service Date: 08/11/2017  Length of Stay: 0     Subjective:      Chief Complaint: <principal problem not specified>     History of Present Illness: 89F with a past medical history of HTN, DM, severe aortic stenosis, thyroid disease, dementia, HLD, GERD and depression who presented to [facility] after an unwitnessed wall at her nursing home. No laceration or external trauma noted by the family but per pt's daughter the pt began to be more altered that her baseline. At baseline she is often not oriented to time or place but if usually fluent and conversant. Currently the patient only mumbles and repeats a few words. She follows some commands but is drowsy and less conversant that usual. Pt had a CT of her head done that showed possible subdural, although when viewed here appeared more like a contusion vs. small SAH. INR 1.0. Pt takes ASA 81 at home, last dose was yesterday. She is not only any blood thinners.      Hospital Course: No notes on file        Review of Systems   Constitutional: Positive for activity change. Negative for fever.   Respiratory: Negative for apnea and shortness of breath.    Cardiovascular: Negative for chest pain.   Gastrointestinal: Positive for abdominal pain.         Objective:      Vitals:  Temp: 98 °F (36.7 °C) (08/11/17 1500)  Pulse: 83 (08/11/17 1504)  Resp: 20 (08/11/17 1422)  BP: (!) 147/67 (08/11/17 1504)  SpO2: 96 % (08/11/17 1451)     Temp:  [98 °F (36.7 °C)] 98 °F (36.7 °C)  Pulse:  [77-83] 83  Resp:  [16-28] 20  SpO2:  [96 %-99 %] 96 %  BP: (132-198)/(67-92) 147/67                 No intake/output data recorded.     Physical Exam   Constitutional: She appears well-developed and well-nourished.   HENT:   Head: Normocephalic and atraumatic.   Eyes: EOM are normal. Pupils are equal, round, and reactive to light.   Neck: Normal range of motion.   Cardiovascular: Normal rate and regular rhythm.     Murmur heard.  Pulmonary/Chest: Breath sounds normal.   Abdominal: Soft. There is no tenderness.   Neurological: She is alert. She has normal strength and normal reflexes. She is disoriented. No cranial nerve deficit or sensory deficit.         Medications:  Continuous  nicardipine Last Rate: 5 mg/hr (08/11/17 1503)   sodium chloride 0.9%     Scheduled  citalopram 10 mg Daily   ferrous sulfate 325 mg Daily   [START ON 8/12/2017] furosemide 20 mg Every other day   hydrALAZINE 25 mg TID   insulin detemir 13 Units QHS   levetiracetam IVPB 1,000 mg Once   [START ON 8/12/2017] levetiracetam IVPB 1,000 mg Q12H   levothyroxine 125 mcg Daily   losartan 100 mg Daily   metoprolol tartrate 50 mg BID   pantoprazole 40 mg Daily   senna-docusate 8.6-50 mg 1 tablet BID   simvastatin 20 mg QHS   sodium chloride 0.9% 250 mL Once   sodium chloride 0.9% 3 mL Q8H   PRN  hydrocodone-acetaminophen 5-325mg 1 tablet Q6H PRN   ondansetron 4 mg Q12H PRN   sodium chloride 0.9% 250 mL Continuous PRN      Today I personally reviewed pertinent medications, lines/drains/airways, imaging, cardiology, lab results, microbiology results, notably:           Assessment/Plan:          Neuro   Cerebral contusion     - fall of unknown cause at NH; will get echo, U/A and CXR to rule out these causes  - CT head at OSH showing small left contusion vs. Small left SAH  - pt altered and confused with no other focal neuro deficits  - pending repeat CT head  - will start Keppra for seizure prophylaxis for 7 days  - SBP goal <160  - NPO until passes swallow study; normal saline @ 50/hr to avoid overload 2/2 aortic dysfunction       Psychiatric   Depression     - cont citalopram 10 mg qhs       Cardiac/Vascular   Nonrheumatic aortic valve stenosis     - no acute issues       Essential hypertension     - BP in the 200s/90s on admit  - placed on cardene drip in the ED, will cont and admit to ICU for monitoring  - will restart home BP meds and titrate off cardene           Endocrine   Thyroid dysfunction     - cont home synthroid 125 mcg daily       Type 2 diabetes mellitus without complication     - Glu 183 on admit  - on Lantus 25 units qHS at home, pt currently NPO, will order 13 units for now and increase to home dose once eating again  - SSI and accuchecks              Prophylaxis:  Venous Thromboembolism: mechanical  Stress Ulcer: None  Ventilator Pneumonia: no          Activity Orders            None          DNR     Jenn Huddleston MD  Neurocritical Care  [facility]         Attending Note:    1. Traumatic left temporal SAH  2. Dementia  3. AS  4. HTN  5. DM    A/P:  - Admit to [facility]  - Q1H neuro checks  - repeat CTH  - Keppra prophylaxis   - TTE  - SBP<160.  - Restart home meds  - SCD    Care level 3.

## 2017-08-12 NOTE — PROGRESS NOTES
ICU Progress Note  Neurocritical Care    Admit Date: 8/11/2017  LOS: 1    CC: <principal problem not specified>    Code Status: DNR     SUBJECTIVE:     Interval History/Significant Events:   No acute events overnight         Medications:  Continuous Infusions:   sodium chloride 0.9%       Scheduled Meds:   citalopram  10 mg Oral Daily    ferrous sulfate  325 mg Oral Daily    furosemide  20 mg Oral Every other day    hydrALAZINE  25 mg Oral TID    insulin detemir  13 Units Subcutaneous QHS    levetiracetam IVPB  250 mg Intravenous Q12H    levothyroxine  125 mcg Oral Daily    losartan  100 mg Oral Daily    metoprolol tartrate  50 mg Oral BID    nitrofurantoin (macrocrystal-monohydrate)  100 mg Oral Q12H    pantoprazole  40 mg Oral Daily    senna-docusate 8.6-50 mg  1 tablet Oral BID    simvastatin  20 mg Oral QHS    sodium chloride 0.9%  3 mL Intravenous Q8H     PRN Meds:.dextrose 50%, glucagon (human recombinant), hydrocodone-acetaminophen 5-325mg, insulin aspart, labetalol, ondansetron, sodium chloride 0.9%    OBJECTIVE:   Vital Signs (Most Recent):   Temp: 98.6 °F (37 °C) (08/12/17 1105)  Pulse: (!) 59 (08/12/17 1105)  Resp: 12 (08/12/17 1105)  BP: (!) 123/59 (08/12/17 1105)  SpO2: 96 % (08/12/17 1105)    Vital Signs (24h Range):   Temp:  [98 °F (36.7 °C)-99.5 °F (37.5 °C)] 98.6 °F (37 °C)  Pulse:  [59-84] 59  Resp:  [12-36] 12  SpO2:  [92 %-100 %] 96 %  BP: (115-191)/(54-87) 123/59    ICP/CPP (Last 24h):        I & O (Last 24h):   Intake/Output Summary (Last 24 hours) at 08/12/17 1320  Last data filed at 08/12/17 1005   Gross per 24 hour   Intake          1035.71 ml   Output              250 ml   Net           785.71 ml     Physical Exam:  Alert, NAD  No jaundice  Lungs are clear to auscultation, good air entry bilateral  Normal S1/S2, ESM  Abdomen is soft, lax, +ve BS.      Neuro:  Alert, oriented to self and hospital  Follow full commands  PERRL, EOMI  CN ii-xii are intact.  Motor: 4+/5x4   Intact  sensation to LT and PP in all extremities.  FTN with no dysmetria or ataxia bilateral        Vent Data:        Lines/Drains/Airway:         Female External Urinary Catheter 08/12/17 0300 (Active)   Skin perineum cleansed w/ soap and water 8/12/2017 11:05 AM   Tolerance no signs/symptoms of discomfort 8/12/2017 11:05 AM   Suction Continuous suction at 40 mmHg 8/12/2017  9:05 AM   Date of last wick change 08/12/17 8/12/2017  9:05 AM   Time of last wick change 0300 8/12/2017  3:05 AM   Output (mL) 250 mL 8/12/2017 10:05 AM     Nutrition/Tube Feeds (if NPO state why):      Labs:  ABG: No results for input(s): PH, PO2, PCO2, HCO3, POCSATURATED, BE in the last 24 hours.  BMP:  Recent Labs  Lab 08/12/17  0307      K 3.9      CO2 19*   BUN 23   CREATININE 1.5*   *   MG 1.7   PHOS 3.4     LFT: Lab Results   Component Value Date    AST 12 08/12/2017    ALT 6 (L) 08/12/2017    ALKPHOS 50 (L) 08/12/2017    BILITOT 0.4 08/12/2017    ALBUMIN 2.8 (L) 08/12/2017    PROT 6.5 08/12/2017     CBC:   Lab Results   Component Value Date    WBC 8.32 08/12/2017    HGB 9.2 (L) 08/12/2017    HCT 28.2 (L) 08/12/2017    MCV 88 08/12/2017     08/12/2017     Microbiology x 7d:   Microbiology Results (last 7 days)     Procedure Component Value Units Date/Time    Urine culture [[id number]] Collected:  08/11/17 1513    Order Status:  No result Specimen:  Urine Updated:  08/11/17 7510        Imaging:  CTH showed stable SAH  I personally reviewed the above image.    ASSESSMENT/PLAN:     1. Traumatic left temporal SAH  2. Dementia  3. AS  4. HTN  5. DM    A/P:  - Neurological exam unchanged  - Repeated CTH with stable SAH  - Continue keppra prophylaxis for 7 days   - On 2 L nC, titrate down as tolerated  - SBP<160.  - F/U TTE  - Tolerating PO  - DC IVF  - PT/OT  - Start SQH    Care level 2

## 2017-08-12 NOTE — PROVIDER TRANSFER
Neuro Critical Care Transfer of Care note    Date of Admit: 8/11/2017  Date of Transfer / Stepdown: 8/12/2017    Brief History of Present Illness:      Lulú Eugene is a 89 y.o. female who  has a past medical history of Dementia; Depression; Diabetes mellitus; GERD (gastroesophageal reflux disease); Hyperlipidemia; Hypertension; and Thyroid disease.. The patient presented to [facility] on 8/11/2017 with a primary complaint of transfer from [place] (SDH; fell out of wheelchair this morning from nursing home; now altered)    89F with a past medical history of HTN, DM, severe aortic stenosis, thyroid disease, dementia, HLD, GERD and depression who presented to [facility] after an unwitnessed wall at her nursing home. No laceration or external trauma noted by the family but per pt's daughter the pt began to be more altered that her baseline. At baseline she is often not oriented to time or place but if usually fluent and conversant. Currently the patient only mumbles and repeats a few words. She follows some commands but is drowsy and less conversant that usual. Pt had a CT of her head done that showed possible subdural, although when viewed here appeared more like a contusion vs. small SAH. INR 1.0. Pt takes ASA 81 at home, last dose was yesterday. She is not only any blood thinners.      Hospital Course By Problem with Pertinent Findings:     1. Cerebral contusion  - fall while putting on shoes unattended at assisted living  - no focal neuro deficits, mild dementia @ baseline  - CT head at OSH showing small left contusion vs. small left SAH  - repeat CTH stable with few punctate and linear hyperattenuating foci in the inferior left temporal lobe and adjacent to the left sylvian fissure suggestive of trace acute subarachnoid hemorrhage. No significant edema, mass effect or midline shift.  - keppra 250 mg BID x 7 days, sz prophylaxis  - SBP goal <160  - No neurosurgical intervention  - TTF  medicine      2. Depression  - citalopram 10 mg qhs      3. Nonrheumatic aortic valve stenosis  - no acute issues  - echo results pending       4. Essential hypertension  -home meds restarted  -hydralazine 25 mg TID  -losartan 100 mg qd  -metoprolol 50 mg BID  -lasix 20mg every other day  -SBP<160    5. Type 2 diabetes mellitus without complication  -insulin detemir 13 U qhs  -SSI  -A1C 7.0    6. Hypothyroidism  -TSH 0.208  -FT4 1.30  -synthroid 125 mcg qd    7. Hyperlipidemia  -simvastatin 20 mg qhs    8. UTI  -reported by patient's daughter  -was rx macrobid 100 mg BID  -3 days remaining    9. GERD  -pantoprazole 40 mg qd        Consultants and Procedures:     Consultants:  Neurosurgery     Procedures:    none    Transfer Information:     Diet:  Diabetic diet 1800 andrea pureed     Physical Activity:  AAT  PT OT SLP ordered     To Do / Pending Studies / Follow ups:  -PT OT to determine rehab placement/send back to assisted living      Fadi Higuera  Neuro Crtical Care

## 2017-08-12 NOTE — CONSULTS
"  [facility]  Adult Nutrition  Consult Note    SUMMARY     Recommendations  Recommendation/Intervention:     1. Continue Regular diet with texture recommendations per SLP   2. If PO intake <50%, order Boost Glucose Control TID   3. RD to monitor    Goals: Pt to meet >85% EEN  Nutrition Goal Status: new  Communication of RD Recs: other (comment) (care plan)    Reason for Assessment  Reason for Assessment: physician consult  Diagnosis: other (see comments) (cerebral contusion)  Relevent Medical History: HTN, DM, severe aortic stenosis, thyroid disease, dementia, HLD, GERD and depression    Interdisciplinary Rounds: did not attend     General Information Comments: MARVA performed, SLP advanced diet to regular from pureed. RD unable to see patient as echo performed during time of visit. Will monitor    Nutrition Discharge Planning: Too soon to determine    Nutrition Prescription Ordered  Current Diet Order: Regular     Evaluation of Received Nutrients/Fluid Intake    IV Fluid (mL): 935.7 (x24 hrs)      I/O: +935mL since admit       % Intake of Estimated Energy Needs: Other: MANISHA at this time - awaiting meal  % Meal Intake: Other: awaiting meal     Nutrition Risk Screen     Nutrition Risk Screen: no indicators present    Nutrition/Diet History    Patient Reported Diet/Restrictions/Preferences: general  Typical Food/Fluid Intake: MANISHA  Food Preferences: no cultural or Denominational needs identified     Factors Affecting Nutritional Intake:  (-)     Labs/Tests/Procedures/Meds  Pertinent Labs Reviewed: reviewed  Pertinent Labs Comments: CO2 19, Glu 118, Cr 1.5, A1c 7.0%  Pertinent Medications Reviewed: reviewed  Pertinent Medications Comments: ferrous sulfate, insulin, levetiracetam, metoprolol, levothyroxine, IVF    Physical Findings  Overall Physical Appearance: listlessness, lethargic, weak     Skin: intact    Anthropometrics  Temp: 98.6 °F (37 °C)     Height: 5' 2.01" (157.5 cm)  Weight Method: Bed " Scale  Weight: 54 kg (119 lb)     Ideal Body Weight (IBW), Female: 110.05 lb     % Ideal Body Weight, Female (lb): 108.13 lb  BMI (Calculated): 21.8  BMI Grade: 18.5-24.9 - normal     Estimated/Assessed Needs  Weight Used For Calorie Calculations: 55.3 kg (121 lb 14.6 oz) (dosing weight)      Energy Calorie Requirements (kcal): 1145 kcal (x1.25)   RMR ([place]-St. Jeor Equation): 916.38     Weight Used For Protein Calculations: 55.3 kg (121 lb 14.6 oz)  Protein Requirements: 55-66 gm  1.0 gm Protein (gm): 55.42 and 1.2 gm Protein (gm): 66.5  Fluid Requirements (mL): 1200mL or per MD  Fluid Need Method: RDA Method     RDA Method (mL): 1145      CHO Requirement: 50% of kcals (150-200gm)     Assessment and Plan    No new Assessment & Plan notes have been filed under this hospital service since the last note was generated.  Service: Nutrition    Nutrition Problem  Inadequate energy intake    Related to (etiology):   Diet advanced    Signs and Symptoms (as evidenced by):   Awaiting meal     Interventions/Recommendations (treatment strategy):  See RD recs above    Nutrition Diagnosis Status:   New      Monitor and Evaluation  Food and Nutrient Intake: energy intake, food and beverage intake  Food and Nutrient Adminstration: diet order     Anthropometric Measurements: weight, weight change  Biochemical Data, Medical Tests and Procedures: electrolyte and renal panel, glucose/endocrine profile, lipid profile  Nutrition-Focused Physical Findings: overall appearance    Nutrition Risk  Level of Risk: other (see comments) (1x/week)    Nutrition Follow-Up  RD Follow-up?: Yes

## 2017-08-12 NOTE — PT/OT/SLP EVAL
Occupational Therapy  Evaluation and Discharge Summary    Lulú Eugene   MRN: [id number]   Admitting Diagnosis: Cerebral contusion s/p unwitnessed fall  OT Date of Treatment: 08/12/17   OT Start Time: 0810  OT Stop Time: 0843  OT Total Time (min): 33 min    Billable Minutes:  Evaluation 10  Self Care/Home Management 23    Diagnosis: Cerebral contusion s/p unwitnessed fall    Past Medical History:   Diagnosis Date    Dementia     Depression     Diabetes mellitus     GERD (gastroesophageal reflux disease)     Hyperlipidemia     Hypertension     Thyroid disease       Past Surgical History:   Procedure Laterality Date    APPENDECTOMY      CHOLECYSTECTOMY         Referring physician: Jenn Huddleston MD  Date referred to OT: 8/11/2017    General Precautions: Standard, fall    Do you have any cultural, spiritual, Christianity conflicts, given your current situation?: none     Patient History:  Living Environment  Lives With: alone  Living Arrangements: assisted living  Living Environment Comment:  (Pt lies at [facility] in [place].  Pt's living space is fully accessible with WIS, bench, grab bar and HH shower head.  Pt owns a rollator which she uses daily to get around room and to dining area. PTA pt req s/u for self feeding. )  Equipment Currently Used at Home: rollator, shower chair     Prior to admission pt's daughter reports she was on hospice care at the [facility] for aortic stenosis (not operative candidate due to DNR status). She will resume hospice upon return per daughter.     Prior level of function:   Bed Mobility/Transfers: needs assist  Grooming: needs assist  Bathing: needs device and assist  Upper Body Dressing: needs assist  Lower Body Dressing: needs assist  Toileting: needs assist  Home Management Skills: needs assist  Homemaking Responsibilities: No  Driving License: No     Dominant hand: right    Subjective:  Communicated with RN prior to session.  Pt agreeable to therapy.   Chief  Complaint: None   Patient/Family stated goals: none identified     Pain/Comfort  Pain Rating 1: 0/10    Objective:  Patient found with: telemetry, peripheral IV    Cognitive Exam:  Oriented to: Person only  Follows Commands/attention: Follows one-step commands  Communication: clear/fluent  Memory:  Impaired/dementia  Safety awareness/insight to disability: intact  Coping skills/emotional control: Appropriate to situation    Visual/perceptual:  Intact    Physical Exam:  Postural examination/scapula alignment: No postural abnormalities identified  Skin integrity: Visible skin intact  Edema: None noted     Sensation:   Intact    Upper Extremity Range of Motion:  Right Upper Extremity: WFL  Left Upper Extremity: WFL    Upper Extremity Strength:  Right Upper Extremity: WFL  Left Upper Extremity: WFL   Strength: WFL  5/5 B UE    Fine motor coordination:   Intact    Functional Mobility:  Bed Mobility:  Rolling/Turning to Left: Modified independent  Rolling/Turning Right: Modified independent  Scooting/Bridging: Modified Independent  Supine to Sit: Modified Independent  Sit to Supine: Modified Independent    Transfers:  Sit <> Stand Assistance: Contact Guard Assistance  Sit <> Stand Assistive Device: No Assistive Device  Bed <> Chair Technique: Other (see comments) (ambulated)  Bed <> Chair Transfer Assistance: Minimum Assistance  Bed <> Chair Assistive Device: No Assistive Device    Functional Ambulation: min A with HHA    Activities of Daily Living:  Feeding Level of Assistance: Total assistance  UE Dressing Level of Assistance: Total assistance  LE Dressing Level of Assistance: Total assistance  Grooming Level of Assistance:  (Activity did not occur)  Toileting Where Assessed: Bed level  Toileting Level of Assistance: Total assistance (pt found with BM. She was able to stand for cleaning. )    Balance:   Static Sit: GOOD+: Takes MAXIMAL challenges from all directions.    Dynamic Sit: GOOD+: Maintains balance through  "MAXIMAL excursions of active trunk motion  Static Stand: FAIR+: Takes MINIMAL challenges from all directions  Dynamic stand: FAIR: Needs CONTACT GUARD during gait    AM-PAC 6 CLICK ADL  How much help from another person does this patient currently need?  1 = Unable, Total/Dependent Assistance  2 = A lot, Maximum/Moderate Assistance  3 = A little, Minimum/Contact Guard/Supervision  4 = None, Modified [place]/Independent    Putting on and taking off regular lower body clothing? : 1  Bathing (including washing, rinsing, drying)?: 1  Toileting, which includes using toilet, bedpan, or urinal? : 1  Putting on and taking off regular upper body clothing?: 1  Taking care of personal grooming such as brushing teeth?: 3  Eating meals?: 4  Total Score: 11    AM-PAC Raw Score CMS "G-Code Modifier Level of Impairment Assistance   6 % Total / Unable   7 - 9 CM 80 - 100% Maximal Assist   10-14 CL 60 - 80% Moderate Assist   15 - 19 CK 40 - 60% Moderate Assist   20 - 22 CJ 20 - 40% Minimal Assist   23 CI 1-20% SBA / CGA   24 CH 0% Independent/ Mod I       Patient left supine with all lines intact and call button in reach    Assessment:  Lulú Eugene is a 89 y.o. female with a medical diagnosis of cerebral contusion s/p unwitnessed fall  and presents at baseline with ADLS and functional mobility (for transfers). Pt presents at or near baseline with ADLs and functional mobility within room. She does not demonstrate any coordination or UB strength impairments.      Recommend d/c to [facility] when appropriate. OT services in the acute care setting are not medically necessary.     Rehab identified problem list/impairments: Rehab identified problem list/impairments: impaired functional mobilty, impaired balance, impaired cognition, decreased safety awareness    Activity tolerance: Good    Discharge recommendations: Discharge Facility/Level Of Care Needs: assisted living facility     Barriers to discharge: Barriers to Discharge: " Other (Comment)    Equipment recommendations: none     GOALS:    Occupational Therapy Goals     Not on file          Multidisciplinary Problems (Resolved)        Problem: Occupational Therapy Goal    Goal Priority Disciplines Outcome Interventions   Occupational Therapy Goal   (Resolved)     OT, PT/OT Outcome(s) achieved                    PLAN:  Patient to be discharged from acute OT services.   Plan of Care reviewed with: patient, daughter         Ashlee Sun, DAVID  08/12/2017

## 2017-08-12 NOTE — SUBJECTIVE & OBJECTIVE
Review of patient's allergies indicates:   Allergen Reactions    Devante-1     Codeine     Novocain [procaine]        Past Medical History:   Diagnosis Date    Dementia     Depression     Diabetes mellitus     GERD (gastroesophageal reflux disease)     Hyperlipidemia     Hypertension     Thyroid disease      Past Surgical History:   Procedure Laterality Date    APPENDECTOMY      CHOLECYSTECTOMY       Family History     None        Social History Main Topics    Smoking status: Never Smoker    Smokeless tobacco: Never Used    Alcohol use No    Drug use: No    Sexual activity: Not on file     Review of Systems   Unable to obtain secondary to patient's mental status    Objective:     Weight: 54.4 kg (119 lb 14.9 oz)  Body mass index is 21.94 kg/m².  Vital Signs (Most Recent):  Temp: 98 °F (36.7 °C) (08/11/17 1500)  Pulse: 80 (08/11/17 1900)  Resp: 14 (08/11/17 1900)  BP: 126/81 (08/11/17 1900)  SpO2: 97 % (08/11/17 1900) Vital Signs (24h Range):  Temp:  [98 °F (36.7 °C)] 98 °F (36.7 °C)  Pulse:  [77-84] 80  Resp:  [14-28] 14  SpO2:  [95 %-99 %] 97 %  BP: (126-198)/(62-92) 126/81          Neurosurgery Physical Exam   General: well developed, well nourished, no distress  Head: normocephalic, atraumatic  Neurologic: Alert, mumbling  GCS: Motor: 5/Verbal: 2/Eyes: 4 GCS Total: 11  Mental Status: Awake, drowsy  Language: No aphasia  Speech: No dysarthria  Cranial nerves: face symmetric, tongue midline, CN II-XII grossly intact.   Eyes: pupils equal, round, reactive to light with accommodation, EOMI  Pulmonary: normal respirations, not labored, no accessory muscles used  Abdomen: soft, non-distended, not tender to palpation  Sensory: intact to light touch throughout  Motor Strength: Moves all extremities spontaneously with good tone.  No abnormal movements seen.   Pronator Drift: unable to assess  Finger-to-nose: unable to assess  Barney: absent  Clonus: absent  Babinski: absent  Pulses: 2+ and symmetric  radial and dorsalis pedis  Skin: warm, dry and intact, no rashes  No neck pain with palpation  No visible external trauma      Significant Labs:    Recent Labs  Lab 08/11/17  1402   *      K 4.9      CO2 17*   BUN 20   CREATININE 1.5*   CALCIUM 9.2       Recent Labs  Lab 08/11/17  1402   WBC 8.59   HGB 10.5*   HCT 32.8*          Recent Labs  Lab 08/11/17  1402   INR 1.0   APTT <21.0     Microbiology Results (last 7 days)     Procedure Component Value Units Date/Time    Urine culture [[id number]] Collected:  08/11/17 1513    Order Status:  No result Specimen:  Urine Updated:  08/11/17 1552          Significant Diagnostics:  I personally reviewed [facility] CT Head - small left temporal contusions, no subdural hematoma, no mass effect or midline shift

## 2017-08-12 NOTE — PLAN OF CARE
[facility]     Department of Hospital Medicine     [address] [place] [address]     [phone] after hours  [phone] fax                                   HOSPICE  ORDERS     08/12/2017    Admit to Hospice:     Diagnoses:  Active Hospital Problems    Diagnosis  POA    *Cerebral contusion [S06.339A]  Yes    Essential hypertension [I10]  Yes    Type 2 diabetes mellitus without complication [E11.9]  Yes    Thyroid dysfunction [E07.9]  Yes    Nonrheumatic aortic valve stenosis [I35.0]  No    Depression [F32.9]  Yes      Resolved Hospital Problems    Diagnosis Date Resolved POA   No resolved problems to display.       Hospice Qualifying Diagnoses:   Patient has a life expectancy < 6 months due to these conditions.    Vital Signs: Routine per Hospice Protocol.    Allergies:  Review of patient's allergies indicates:   Allergen Reactions    Devante-1     Codeine     Novocain [procaine]        Diet: regular diet     Activities: As tolerated    Nursing: Per Hospice Routine    Future Orders:  Hospice Medical Director may dictate new orders for comfortable care measures & sign death certificate.    Medications:      Lulú Eugene   Home Medication Instructions JEAN:[id number]    Printed on:08/12/17 9510   Medication Information                      citalopram (CELEXA) 10 MG tablet  Take 10 mg by mouth once daily.             ferrous sulfate 325 mg (65 mg iron) Tab tablet  Take 325 mg by mouth daily with breakfast.             furosemide (LASIX) 20 MG tablet  Take 20 mg by mouth every other day.             hydrALAZINE (APRESOLINE) 25 MG tablet  Take 25 mg by mouth 3 (three) times daily.             hydrocodone-acetaminophen 5-325mg (NORCO) 5-325 mg per tablet  Take 1 tablet by mouth every 6 (six) hours as needed for Pain.             insulin glargine (LANTUS) 100 unit/mL injection  Inject into the skin every evening.             levothyroxine (SYNTHROID)  125 MCG tablet  Take 125 mcg by mouth once daily.             losartan (COZAAR) 100 MG tablet  Take 100 mg by mouth once daily.             metoprolol tartrate (LOPRESSOR) 50 MG tablet  Take 50 mg by mouth 2 (two) times daily.             nitrofurantoin, macrocrystal-monohydrate, (MACROBID) 100 MG capsule  Take 100 mg by mouth 2 (two) times daily.             omeprazole (PRILOSEC) 40 MG capsule  Take 40 mg by mouth once daily.             ondansetron (ZOFRAN) 4 MG tablet  Take 4 mg by mouth every 8 (eight) hours as needed for Nausea.             simvastatin (ZOCOR) 20 MG tablet  Take 20 mg by mouth every evening.               levetiracetam tablet - 250 mg. 1 tab BID x 7 days          DIABETES CARE:    Nurse to perform and educate diabetic management with blood glucose monitoring:      Fingerstick blood sugar a.m. and p.m.     Fingerstick blood sugar AC and HS       Report CBG < 60 or > 350 to physician.         Insulin Sliding Scale         Glucose  Novolog Insulin Subcutaneous        0 - 60   Orange juice or glucose tablet      No insulin   201-250  2 units   251-300  4 units   301-350  6 units   351-400  8 units   >400   10 units then call physician        _________________________________  Fadi Higuera PA-C  08/12/2017

## 2017-08-12 NOTE — PLAN OF CARE
Problem: Patient Care Overview  Goal: Plan of Care Review  Recommendations  Recommendation/Intervention:      1. Continue Regular diet with texture recommendations per SLP   2. If PO intake <50%, order Boost Glucose Control TID   3. RD to monitor     Goals: Pt to meet >85% EEN  Nutrition Goal Status: new

## 2017-08-12 NOTE — PLAN OF CARE
Problem: SLP Goal  Goal: SLP Goal  Speech Language Pathology Goals  Goals expected to be met by 8/19  1. Pt will participate in speech language cognitive eval to determine need for intervention.           Swallow evaluation completed with initiated POC.    Kacey Ybarra M.A. CCC-SLP  Speech Language Pathologist  [phone]  8/12/2017

## 2017-08-12 NOTE — PLAN OF CARE
Problem: Physical Therapy Goal  Goal: Physical Therapy Goal  Outcome: Outcome(s) achieved Date Met: 08/12/17  No further skilled PT goals at this time

## 2017-08-12 NOTE — HPI
"Ms. Eugene is a 89F with PMHx HTN, DM, severe aortic stenosis, thyroid disease, dementia, HLD, GERD and depression who presented to [facility] after an unwitnessed wall at her assisted living facility.  She was taken to [facility], a CT Head was done that showed "subdural hematoma", and she was transferred to [facility] for neurosurgical evaluation.  Daughter is bedside and gives history due to patient's AMS.  The daughter states the patient is not at her baseline.  She is usually conversant and follows commands, answers most questions appropriately. The patient takes ASA 81 daily and last had a dose yesterday. She is currently mumbling and not following commands, which is not usual for her per daughter.    "

## 2017-08-12 NOTE — DISCHARGE SUMMARY
Discharge Summary  Critical Care    Admit Date: 8/11/2017    Discharge Date:     LOS: 1    Principle Diagnosis: Cerebral contusion    Secondary Diagnoses:   Active Hospital Problems    Diagnosis  POA    *Cerebral contusion [S06.339A]  Yes    Essential hypertension [I10]  Yes    Type 2 diabetes mellitus without complication [E11.9]  Yes    Thyroid dysfunction [E07.9]  Yes    Nonrheumatic aortic valve stenosis [I35.0]  No    Depression [F32.9]  Yes      Resolved Hospital Problems    Diagnosis Date Resolved POA   No resolved problems to display.        HPI:  89F with a past medical history of HTN, DM, severe aortic stenosis, thyroid disease, dementia, HLD, GERD and depression who presented to [facility] after an unwitnessed fall at her nursing home. No laceration or external trauma noted by the family but per pt's daughter the pt began to be more altered that her baseline. At baseline she is often not oriented to time or place but if usually fluent and conversant. Currently the patient only mumbles and repeats a few words. She follows some commands but is drowsy and less conversant that usual. Pt had a CT of her head done that showed possible subdural, although when viewed here appeared more like a contusion vs. small SAH. INR 1.0. Pt takes ASA 81 at home, last dose was yesterday. She is not only any blood thinners.     Hospital/ICU Course:  8/11 CTH at OSH small left contusion vs small SAH. No home anticoagulation. No need for reversal. No detrimental bleed. Pt stable on RA. NSGY consult. No intervention. No focal neuro deficit. Dementia @ baseline. SBP<160, maintaining on home HTN regimen.   8/12: repeat CTH stable. NSGY signed off. Stable to transfer/discharge back to assisted living. Pt to follow up with PCP in 1 week. keppra 250 mg BID x 7d      Significant Imaging:  Imaging Results     CT Head Without Contrast (Final result)     Abnormal  Result time 08/12/17 07:04:06    Final result by Cristi CHERRY  MD Anupam (08/12/17 07:04:06)          Impression:         Few punctate and linear hyperattenuating foci in the inferior left temporal lobe and adjacent to the left sylvian fissure suggestive of trace acute subarachnoid hemorrhage. No significant edema, mass effect or midline shift.    Generalized cerebral volume loss with compensatory prominence of the ventricles as well as moderate hypoattenuation of supratentorial white matter, nonspecific but suggestive of moderate chronic microvascular ischemic changes.    Small remote infarct left frontal lobe.    Report flagged in the [facility] medical record.    Preliminary report discussed with Dr. Mujica with NCC by Dr. Nascimento at 06:35:41 on Saturday, August 12, 2017.  ______________________________________     Electronically signed by resident: KAZ NASCIMENTO MD  Date:     08/12/17  Time:    06:38            As the supervising and teaching physician, I personally reviewed the images and resident's interpretation and I agree with the findings.          Electronically signed by: ALONSO LOO MD  Date:     08/12/17  Time:    07:04              Narrative:    CT head without contrast    08/12/17 06:18:00    Accession# [id number]    CLINICAL INDICATION: 89 year old F with contusion    TECHNIQUE: Axial CT images obtained throughout the region of the head without the use of intravenous contrast. Axial, sagittal and coronal reconstructions were performed.    COMPARISON: No priors.    FINDINGS:    There is generalized cerebral volume loss with compensatory prominence of the ventricles and sulci. No evidence of hydrocephalus.    Small region of encephalomalacia is noted in the left frontal lobe most compatible with a remote infarct.  Focal hypoattenuating area noted within the inferior aspect of the left basal ganglia, favored to represent a prominent perivascular space. There is hypoattenuation of the periventricular/supratentorial white matter which is nonspecific but suggestive  for chronic microvascular ischemic changes. There is subtle linear hyperdense material in the left inferior temporal lobe and adjacent to the left sylvian fissure most compatible with a small volume of acute subarachnoid hemorrhage. No significant mass effect or midline shift. No parenchymal mass or evidence of an acute major vascular distribution infarct.    No abnormal extra-axial fluid collections are seen.    The cranium appears intact. Mastoid air cells and paranasal sinuses are essentially clear.                   Significant Laboratory Data:  No results found for this or any previous visit (from the past 336 hour(s)).  Recent Results (from the past 336 hour(s))   CBC auto differential    Collection Time: 08/12/17  3:07 AM   Result Value Ref Range    WBC 8.32 3.90 - 12.70 K/uL    Hemoglobin 9.2 (L) 12.0 - 16.0 g/dL    Hematocrit 28.2 (L) 37.0 - 48.5 %    Platelets 199 150 - 350 K/uL   CBC auto differential    Collection Time: 08/11/17  2:02 PM   Result Value Ref Range    WBC 8.59 3.90 - 12.70 K/uL    Hemoglobin 10.5 (L) 12.0 - 16.0 g/dL    Hematocrit 32.8 (L) 37.0 - 48.5 %    Platelets 188 150 - 350 K/uL     Lab Results   Component Value Date    HGBA1C 7.0 (H) 08/11/2017     Microbiology Results (last 7 days)     Procedure Component Value Units Date/Time    Urine culture [[id number]] Collected:  08/11/17 9293    Order Status:  No result Specimen:  Urine Updated:  08/11/17 1550            Consultations:  IP CONSULT TO NEURO CRITICAL CARE  IP CONSULT TO NEUROSURGERY  IP CONSULT TO DIETARY  IP CONSULT TO SOCIAL WORK/CASE MANAGEMENT      Procedures:  none      Discharge Medications:   Lulú Eugene   Home Medication Instructions JEAN:[id number]    Printed on:08/12/17 1503   Medication Information                      citalopram (CELEXA) 10 MG tablet  Take 10 mg by mouth once daily.             ferrous sulfate 325 mg (65 mg iron) Tab tablet  Take 325 mg by mouth daily with breakfast.             furosemide  (LASIX) 20 MG tablet  Take 20 mg by mouth every other day.             hydrALAZINE (APRESOLINE) 25 MG tablet  Take 25 mg by mouth 3 (three) times daily.             hydrocodone-acetaminophen 5-325mg (NORCO) 5-325 mg per tablet  Take 1 tablet by mouth every 6 (six) hours as needed for Pain.             insulin glargine (LANTUS) 100 unit/mL injection  Inject into the skin every evening.             levothyroxine (SYNTHROID) 125 MCG tablet  Take 125 mcg by mouth once daily.             losartan (COZAAR) 100 MG tablet  Take 100 mg by mouth once daily.             metoprolol tartrate (LOPRESSOR) 50 MG tablet  Take 50 mg by mouth 2 (two) times daily.             nitrofurantoin, macrocrystal-monohydrate, (MACROBID) 100 MG capsule  Take 100 mg by mouth 2 (two) times daily.             omeprazole (PRILOSEC) 40 MG capsule  Take 40 mg by mouth once daily.             ondansetron (ZOFRAN) 4 MG tablet  Take 4 mg by mouth every 8 (eight) hours as needed for Nausea.             simvastatin (ZOCOR) 20 MG tablet  Take 20 mg by mouth every evening.               Diet: Regular diet     Level of Activity: As tolerated.    Follow up Plan:  1) Follow up with primary care physician in 1-2 weeks.     Studies Pending: None    Discharge Disposition:  Patient was discharged to assisted living facility in stable condition.    This discharge took greater than 30 minutes to complete.

## 2017-08-12 NOTE — PT/OT/SLP EVAL
Physical Therapy  Evaluation/ Discharge    Lulú Euegne   MRN: [id number]   Admitting Diagnosis: Cerebral contusion    PT Received On: 08/12/17  PT Start Time: 1335     PT Stop Time: 1358    PT Total Time (min): 23 min       Billable Minutes:  Evaluation 15 mins and Gait Training 8 mins    Diagnosis: Cerebral contusion s/p unwitnessed fall    Past Medical History:   Diagnosis Date    Dementia     Depression     Diabetes mellitus     GERD (gastroesophageal reflux disease)     Hyperlipidemia     Hypertension     Thyroid disease       Past Surgical History:   Procedure Laterality Date    APPENDECTOMY      CHOLECYSTECTOMY       General Precautions: Standard, fall  Orthopedic Precautions: N/A   Braces: N/A       Do you have any cultural, spiritual, Pentec[employer]al conflicts, given your current situation?: none noted    Patient History:  Lives With: alone  Living Arrangements: assisted living  Living Environment Comment: pt lives at [facility] in [place]. Pt's living space is fully accessible Pt owns a rollator which she uses daily to get around room and dining area. Pt's dtr reports that pt will have assistance upon d/c.   Equipment Currently Used at Home: rollator, shower chair    Previous Level of Function:  Ambulation Skills: needs device  Transfer Skills: needs device    Subjective:  Communicated with RN prior to session.  Pt and dtr agreeable to session  Chief Complaint: being tired  Patient goals: to go home    Pain/Comfort  Pain Rating 1: 0/10  Pain Rating Post-Intervention 1: 0/10      Objective:   Patient found with: blood pressure cuff, telemetry, pulse ox (continuous)     Cognitive Exam:  Oriented to: Person    Follows Commands/attention: Follows one-step commands  Communication: clear/fluent  Safety awareness/insight to disability: intact    Physical Exam:  Postural examination/scapula alignment: No postural abnormalities identified    Skin integrity: Visible skin intact  Edema: None noted BLE    Lower  Extremity Range of Motion:  Right Lower Extremity: WFL  Left Lower Extremity: WFL    Lower Extremity Strength:  Right Lower Extremity: WFL  Left Lower Extremity: WFL    Gross motor coordination: WFL    Functional Mobility:  Bed Mobility:  Scooting/Bridging: Stand by Assistance  Supine to Sit: Supervision  Sit to Supine: Supervision    Transfers:  Sit <> Stand Assistance: Stand By Assistance  Sit <> Stand Assistive Device: Rolling Walker    Gait:   Gait Distance: ~200 feet  Gait Assistive Device: Rolling walker  Gait Pattern: reciprocal  Gait Deviation(s): decreased ela, increased time in double stance, decreased step length, decreased weight-shifting ability    Balance:   Static Sit: GOOD: Takes MODERATE challenges from all directions  Dynamic Sit: GOOD-: Maintains balance through MODERATE excursions of active trunk movement,     Static Stand: FAIR+: Takes MINIMAL challenges from all directions  Dynamic stand: FAIR+: Needs CLOSE SUPERVISION during gait and is able to right self with minor LOB    Therapeutic Activities and Exercises:  Pt is educated on upright posture and safety with ambulation.    AM-[facility] 6 CLICK MOBILITY  How much help from another person does this patient currently need?   1 = Unable, Total/Dependent Assistance  2 = A lot, Maximum/Moderate Assistance  3 = A little, Minimum/Contact Guard/Supervision  4 = None, Modified [place]/Independent    Turning over in bed (including adjusting bedclothes, sheets and blankets)?: 3  Sitting down on and standing up from a chair with arms (e.g., wheelchair, bedside commode, etc.): 3  Moving from lying on back to sitting on the side of the bed?: 3  Moving to and from a bed to a chair (including a wheelchair)?: 3  Need to walk in hospital room?: 3  Climbing 3-5 steps with a railing?: 1  Total Score: 16     AM-PAC Raw Score CMS G-Code Modifier Level of Impairment Assistance   6 % Total / Unable   7 - 9 CM 80 - 100% Maximal Assist   10 - 14 CL 60 - 80%  Moderate Assist   15 - 19 CK 40 - 60% Moderate Assist   20 - 22 CJ 20 - 40% Minimal Assist   23 CI 1-20% SBA / CGA   24 CH 0% Independent/ Mod I     Patient left supine with all lines intact, call button in reach, RN notified and dtr present.    Assessment:   uLlú Eugene is a 89 y.o. female with a medical diagnosis of Cerebral contusion and presents with deficits listed below. Pt does not requrie further skilled PT at this time in this setting.    Rehab identified problem list/impairments: Rehab identified problem list/impairments: weakness, impaired endurance, impaired functional mobilty, gait instability, impaired balance, impaired cardiopulmonary response to activity, decreased safety awareness    Rehab potential is good.    Activity tolerance: Good    Discharge recommendations: Discharge Facility/Level Of Care Needs: assisted living facility     Barriers to discharge: Barriers to Discharge: None    Equipment recommendations: Equipment Needed After Discharge: none     GOALS:    Physical Therapy Goals     Not on file          Multidisciplinary Problems (Resolved)        Problem: Physical Therapy Goal    Goal Priority Disciplines Outcome Goal Variances Interventions   Physical Therapy Goal   (Resolved)     PT/OT, PT Outcome(s) achieved                     PLAN:    Patient to be discharged from skilled PT at this time.   Plan of Care reviewed with: patient, daughter          Sarah Cm, PT  08/12/2017

## 2017-08-12 NOTE — ASSESSMENT & PLAN NOTE
Ms. Eugene is a 89yoF s/p fall with small left temporal contusion  -Admit to [facility]  -Neuro checks q1h  -Repeat CTH now  -SBP <140  -Goal eunatremia  -Keppra for seizure prophylaxis  -Discussed with Dr. Taylor

## 2017-08-12 NOTE — PLAN OF CARE
Problem: Occupational Therapy Goal  Goal: Occupational Therapy Goal  Outcome: Outcome(s) achieved Date Met: 08/12/17  Evaluation completed.  OT plan of care developed and reviewed with patient and daughter.   Pt presents at or near baseline with ADLs and functional mobility within room. She does not demonstrate any coordination or UB strength impairments.     Recommend d/c to snf when appropriate. OT services in the acute care setting are not medically necessary.     Thank you for the referral,  DAVID Martin  8/12/2017  Rehab Services

## 2017-08-12 NOTE — PT/OT/SLP EVAL
Speech Language Pathology  Evaluation    Lulú Eugene   MRN: [id number]   Admitting Diagnosis: SDH    Diet recommendations: Solid Diet Level: Regular  Liquid Diet Level: Thin   Standard precautions  Alternate bites/sips    SLP Treatment Date: 08/12/17  Speech Start Time: 1007     Speech Stop Time: 1015     Speech Total (min): 8 min       TREATMENT BILLABLE MINUTES:  Eval Swallow and Oral Function 8    Diagnosis: SDH      Past Medical History:   Diagnosis Date    Dementia     Depression     Diabetes mellitus     GERD (gastroesophageal reflux disease)     Hyperlipidemia     Hypertension     Thyroid disease      Past Surgical History:   Procedure Laterality Date    APPENDECTOMY      CHOLECYSTECTOMY         Has the patient been evaluated by SLP for swallowing? : Yes  Keep patient NPO?: No   General Precautions: Standard,            Social Hx: Patient lives at NH    Prior diet: No reported restrictions; no SLP notes in Epic.      Subjective:  Pt awake; pleasant  Patient goals: none stated    Pain/Comfort  Pain Rating 1: 0/10  Pain Rating Post-Intervention 1: 0/10    Objective:        Oral Musculature Evaluation  Oral Musculature: unable to assess due to poor participation/comprehension, WFL  Dentition: present and adequate  Mucosal Quality: good  Mandibular Strength and Mobility: WFL  Oral Labial Strength and Mobility: WFL  Lingual Strength and Mobility: WFL  Buccal Strength and Mobility: WFL  Volitional Cough: unable to demosntrate on command  Volitional Swallow: unable to demonstrate on command  Voice Prior to PO Intake: clear     Bedside Swallow Eval:  Consistencies Assessed: Thin liquids 2 oz via straw, Puree 3 full spoonfuls and Solids 1/4 donis cracker  Oral Phase: mild excess to oral manipulation and mastication; daughter states baseline; functional with good oral clearance  Pharyngeal Phase: no overt clinical  signs/symptoms of aspiration and no overt clinical signs/symptoms of pharyngeal  dysphagia    Skilled education provided on aspiration precautions and diet recommendations. Whiteboard updated with diet recommendations/aspiration precautions.  No further questions. RN notified of diet recs.                                 Assessment:  Lulú Eugene is a 89 y.o. female with a medical diagnosis of SDH and presents with swallow WFL.    Do you have any cultural, spiritual, Orthodox conflicts, given your current situation?: no     Discharge recommendations: Discharge Facility/Level Of Care Needs: assisted living facility     Goals:    SLP Goals        Problem: SLP Goal    Goal Priority Disciplines Outcome   SLP Goal     SLP    Description:  Speech Language Pathology Goals  Goals expected to be met by 8/19  1. Pt will participate in speech language cognitive eval to determine need for intervention.                            Plan:   Patient to be seen Therapy Frequency: 5 x/week   Plan of Care expires: 09/10/17  Plan of Care reviewed with: patient, daughter, family  SLP Follow-up?: Yes              Kacey Ybarra M.A. CCC-SLP  Speech Language Pathologist  [phone]  8/12/2017

## 2017-08-13 LAB
AORTIC VALVE STENOSIS: ABNORMAL
ESTIMATED PA SYSTOLIC PRESSURE: 67.24
MITRAL VALVE MOBILITY: ABNORMAL
MITRAL VALVE REGURGITATION: ABNORMAL
MITRAL VALVE STENOSIS: ABNORMAL
RETIRED EF AND QEF - SEE NOTES: 55 (ref 55–65)
TRICUSPID VALVE REGURGITATION: ABNORMAL

## 2017-10-15 ENCOUNTER — HOSPITAL ENCOUNTER (INPATIENT)
Facility: HOSPITAL | Age: 82
LOS: 2 days | Discharge: HOSPICE | DRG: 309 | End: 2017-10-17
Attending: EMERGENCY MEDICINE | Admitting: HOSPITALIST
Payer: MEDICARE

## 2017-10-15 DIAGNOSIS — D50.0 IRON DEFICIENCY ANEMIA DUE TO CHRONIC BLOOD LOSS: ICD-10-CM

## 2017-10-15 DIAGNOSIS — E03.9 HYPOTHYROIDISM, UNSPECIFIED TYPE: ICD-10-CM

## 2017-10-15 DIAGNOSIS — K92.1 BLOOD IN STOOL: Primary | ICD-10-CM

## 2017-10-15 DIAGNOSIS — E11.9 TYPE 2 DIABETES MELLITUS WITHOUT COMPLICATION, UNSPECIFIED LONG TERM INSULIN USE STATUS: ICD-10-CM

## 2017-10-15 DIAGNOSIS — I48.91 AF (ATRIAL FIBRILLATION): ICD-10-CM

## 2017-10-15 DIAGNOSIS — I48.91 ATRIAL FIBRILLATION WITH TACHYCARDIC VENTRICULAR RATE: ICD-10-CM

## 2017-10-15 DIAGNOSIS — E03.9 ADULT MYXEDEMA: ICD-10-CM

## 2017-10-15 DIAGNOSIS — I48.91 ATRIAL FIBRILLATION: ICD-10-CM

## 2017-10-15 DIAGNOSIS — F32.A DEPRESSION, UNSPECIFIED DEPRESSION TYPE: ICD-10-CM

## 2017-10-15 DIAGNOSIS — I10 ESSENTIAL HYPERTENSION: ICD-10-CM

## 2017-10-15 LAB
ABO + RH BLD: NORMAL
ALBUMIN SERPL BCP-MCNC: 3.6 G/DL
ALP SERPL-CCNC: 47 U/L
ALT SERPL W/O P-5'-P-CCNC: 16 U/L
ANION GAP SERPL CALC-SCNC: 19 MMOL/L
ANISOCYTOSIS BLD QL SMEAR: SLIGHT
AST SERPL-CCNC: 30 U/L
BASOPHILS # BLD AUTO: 0.1 K/UL
BASOPHILS NFR BLD: 1.3 %
BILIRUB SERPL-MCNC: 0.3 MG/DL
BLD GP AB SCN CELLS X3 SERPL QL: NORMAL
BLD PROD TYP BPU: NORMAL
BLOOD UNIT EXPIRATION DATE: NORMAL
BLOOD UNIT TYPE CODE: 6200
BLOOD UNIT TYPE: NORMAL
BNP SERPL-MCNC: 262 PG/ML
BUN SERPL-MCNC: 24 MG/DL
CALCIUM SERPL-MCNC: 9.9 MG/DL
CHLORIDE SERPL-SCNC: 101 MMOL/L
CO2 SERPL-SCNC: 17 MMOL/L
CODING SYSTEM: NORMAL
CREAT SERPL-MCNC: 2.1 MG/DL
DIFFERENTIAL METHOD: ABNORMAL
DISPENSE STATUS: NORMAL
EOSINOPHIL # BLD AUTO: 0.1 K/UL
EOSINOPHIL NFR BLD: 2 %
ERYTHROCYTE [DISTWIDTH] IN BLOOD BY AUTOMATED COUNT: 22.4 %
EST. GFR  (AFRICAN AMERICAN): 23 ML/MIN/1.73 M^2
EST. GFR  (NON AFRICAN AMERICAN): 20 ML/MIN/1.73 M^2
GLUCOSE SERPL-MCNC: 170 MG/DL
HCT VFR BLD AUTO: 22.3 %
HGB BLD-MCNC: 7.3 G/DL
INR PPP: 1
LYMPHOCYTES # BLD AUTO: 2.2 K/UL
LYMPHOCYTES NFR BLD: 30.4 %
MCH RBC QN AUTO: 30.7 PG
MCHC RBC AUTO-ENTMCNC: 32.6 G/DL
MCV RBC AUTO: 94 FL
MONOCYTES # BLD AUTO: 0.5 K/UL
MONOCYTES NFR BLD: 6.4 %
NEUTROPHILS # BLD AUTO: 4.4 K/UL
NEUTROPHILS NFR BLD: 59.9 %
NUM UNITS TRANS PACKED RBC: NORMAL
OVALOCYTES BLD QL SMEAR: ABNORMAL
PLATELET # BLD AUTO: 258 K/UL
PMV BLD AUTO: 9.1 FL
POCT GLUCOSE: 129 MG/DL (ref 70–110)
POCT GLUCOSE: 156 MG/DL (ref 70–110)
POCT GLUCOSE: 190 MG/DL (ref 70–110)
POLYCHROMASIA BLD QL SMEAR: ABNORMAL
POTASSIUM SERPL-SCNC: 4.3 MMOL/L
PROT SERPL-MCNC: 7.6 G/DL
PROTHROMBIN TIME: 10.4 SEC
RBC # BLD AUTO: 2.36 M/UL
SODIUM SERPL-SCNC: 137 MMOL/L
T4 FREE SERPL-MCNC: <0.4 NG/DL
TROPONIN I SERPL DL<=0.01 NG/ML-MCNC: 0.02 NG/ML
TSH SERPL DL<=0.005 MIU/L-ACNC: 159.84 UIU/ML
WBC # BLD AUTO: 7.3 K/UL

## 2017-10-15 PROCEDURE — 20600001 HC STEP DOWN PRIVATE ROOM

## 2017-10-15 PROCEDURE — 84439 ASSAY OF FREE THYROXINE: CPT

## 2017-10-15 PROCEDURE — 86920 COMPATIBILITY TEST SPIN: CPT

## 2017-10-15 PROCEDURE — 99285 EMERGENCY DEPT VISIT HI MDM: CPT | Mod: 25

## 2017-10-15 PROCEDURE — 93010 ELECTROCARDIOGRAM REPORT: CPT | Mod: ,,, | Performed by: INTERNAL MEDICINE

## 2017-10-15 PROCEDURE — 94761 N-INVAS EAR/PLS OXIMETRY MLT: CPT

## 2017-10-15 PROCEDURE — 86900 BLOOD TYPING SEROLOGIC ABO: CPT

## 2017-10-15 PROCEDURE — 96365 THER/PROPH/DIAG IV INF INIT: CPT

## 2017-10-15 PROCEDURE — 36430 TRANSFUSION BLD/BLD COMPNT: CPT

## 2017-10-15 PROCEDURE — 84484 ASSAY OF TROPONIN QUANT: CPT

## 2017-10-15 PROCEDURE — 93306 TTE W/DOPPLER COMPLETE: CPT

## 2017-10-15 PROCEDURE — P9016 RBC LEUKOCYTES REDUCED: HCPCS

## 2017-10-15 PROCEDURE — 93005 ELECTROCARDIOGRAM TRACING: CPT

## 2017-10-15 PROCEDURE — 83880 ASSAY OF NATRIURETIC PEPTIDE: CPT

## 2017-10-15 PROCEDURE — 25000003 PHARM REV CODE 250: Performed by: HOSPITALIST

## 2017-10-15 PROCEDURE — 84443 ASSAY THYROID STIM HORMONE: CPT

## 2017-10-15 PROCEDURE — 25000003 PHARM REV CODE 250: Performed by: EMERGENCY MEDICINE

## 2017-10-15 PROCEDURE — 80053 COMPREHEN METABOLIC PANEL: CPT

## 2017-10-15 PROCEDURE — 96366 THER/PROPH/DIAG IV INF ADDON: CPT

## 2017-10-15 PROCEDURE — 85025 COMPLETE CBC W/AUTO DIFF WBC: CPT

## 2017-10-15 PROCEDURE — 86850 RBC ANTIBODY SCREEN: CPT

## 2017-10-15 PROCEDURE — 85610 PROTHROMBIN TIME: CPT

## 2017-10-15 RX ORDER — ONDANSETRON 2 MG/ML
8 INJECTION INTRAMUSCULAR; INTRAVENOUS EVERY 8 HOURS PRN
Status: DISCONTINUED | OUTPATIENT
Start: 2017-10-15 | End: 2017-10-17 | Stop reason: HOSPADM

## 2017-10-15 RX ORDER — POTASSIUM CHLORIDE 20 MEQ/15ML
40 SOLUTION ORAL
Status: DISCONTINUED | OUTPATIENT
Start: 2017-10-15 | End: 2017-10-17 | Stop reason: HOSPADM

## 2017-10-15 RX ORDER — IBUPROFEN 200 MG
24 TABLET ORAL
Status: DISCONTINUED | OUTPATIENT
Start: 2017-10-15 | End: 2017-10-17 | Stop reason: HOSPADM

## 2017-10-15 RX ORDER — ACETAMINOPHEN 500 MG
1000 TABLET ORAL EVERY 6 HOURS PRN
Status: DISCONTINUED | OUTPATIENT
Start: 2017-10-15 | End: 2017-10-17 | Stop reason: HOSPADM

## 2017-10-15 RX ORDER — OXYCODONE HYDROCHLORIDE 5 MG/1
5 TABLET ORAL EVERY 4 HOURS PRN
COMMUNITY

## 2017-10-15 RX ORDER — DILTIAZEM HCL/D5W 125 MG/125
10 PLASTIC BAG, INJECTION (ML) INTRAVENOUS CONTINUOUS
Status: DISCONTINUED | OUTPATIENT
Start: 2017-10-15 | End: 2017-10-15

## 2017-10-15 RX ORDER — AMOXICILLIN 250 MG
1 CAPSULE ORAL 2 TIMES DAILY
Status: DISCONTINUED | OUTPATIENT
Start: 2017-10-15 | End: 2017-10-17 | Stop reason: HOSPADM

## 2017-10-15 RX ORDER — DILTIAZEM HCL 1 MG/ML
5 INJECTION, SOLUTION INTRAVENOUS
Status: COMPLETED | OUTPATIENT
Start: 2017-10-15 | End: 2017-10-15

## 2017-10-15 RX ORDER — CITALOPRAM 10 MG/1
10 TABLET ORAL DAILY
Status: DISCONTINUED | OUTPATIENT
Start: 2017-10-15 | End: 2017-10-17 | Stop reason: HOSPADM

## 2017-10-15 RX ORDER — PANTOPRAZOLE SODIUM 40 MG/1
40 TABLET, DELAYED RELEASE ORAL DAILY
Status: DISCONTINUED | OUTPATIENT
Start: 2017-10-15 | End: 2017-10-17 | Stop reason: HOSPADM

## 2017-10-15 RX ORDER — GLUCAGON 1 MG
1 KIT INJECTION
Status: DISCONTINUED | OUTPATIENT
Start: 2017-10-15 | End: 2017-10-17 | Stop reason: HOSPADM

## 2017-10-15 RX ORDER — METOPROLOL TARTRATE 50 MG/1
50 TABLET ORAL 2 TIMES DAILY
Status: DISCONTINUED | OUTPATIENT
Start: 2017-10-15 | End: 2017-10-17 | Stop reason: HOSPADM

## 2017-10-15 RX ORDER — LANOLIN ALCOHOL/MO/W.PET/CERES
800 CREAM (GRAM) TOPICAL
Status: DISCONTINUED | OUTPATIENT
Start: 2017-10-15 | End: 2017-10-17 | Stop reason: HOSPADM

## 2017-10-15 RX ORDER — LEVOTHYROXINE SODIUM 125 UG/1
125 TABLET ORAL
Status: COMPLETED | OUTPATIENT
Start: 2017-10-15 | End: 2017-10-15

## 2017-10-15 RX ORDER — POTASSIUM CHLORIDE 20 MEQ/15ML
60 SOLUTION ORAL
Status: DISCONTINUED | OUTPATIENT
Start: 2017-10-15 | End: 2017-10-17 | Stop reason: HOSPADM

## 2017-10-15 RX ORDER — SIMVASTATIN 10 MG/1
20 TABLET, FILM COATED ORAL NIGHTLY
Status: DISCONTINUED | OUTPATIENT
Start: 2017-10-15 | End: 2017-10-17 | Stop reason: HOSPADM

## 2017-10-15 RX ORDER — MULTIVIT WITH MINERALS/HERBS
1 TABLET ORAL DAILY
COMMUNITY

## 2017-10-15 RX ORDER — IBUPROFEN 200 MG
16 TABLET ORAL
Status: DISCONTINUED | OUTPATIENT
Start: 2017-10-15 | End: 2017-10-17 | Stop reason: HOSPADM

## 2017-10-15 RX ORDER — FERROUS SULFATE 325(65) MG
325 TABLET, DELAYED RELEASE (ENTERIC COATED) ORAL
Status: DISCONTINUED | OUTPATIENT
Start: 2017-10-16 | End: 2017-10-17 | Stop reason: HOSPADM

## 2017-10-15 RX ORDER — INSULIN ASPART 100 [IU]/ML
0-5 INJECTION, SOLUTION INTRAVENOUS; SUBCUTANEOUS
Status: DISCONTINUED | OUTPATIENT
Start: 2017-10-15 | End: 2017-10-17 | Stop reason: HOSPADM

## 2017-10-15 RX ORDER — RAMELTEON 8 MG/1
8 TABLET ORAL NIGHTLY PRN
Status: DISCONTINUED | OUTPATIENT
Start: 2017-10-15 | End: 2017-10-17 | Stop reason: HOSPADM

## 2017-10-15 RX ORDER — HALOPERIDOL 1 MG/1
2 TABLET ORAL
Status: DISCONTINUED | OUTPATIENT
Start: 2017-10-15 | End: 2017-10-17 | Stop reason: HOSPADM

## 2017-10-15 RX ORDER — NAPROXEN SODIUM 220 MG/1
81 TABLET, FILM COATED ORAL DAILY
COMMUNITY

## 2017-10-15 RX ORDER — NAFTIFINE HYDROCHLORIDE 1 MG/G
CREAM TOPICAL NIGHTLY
COMMUNITY

## 2017-10-15 RX ORDER — ERGOCALCIFEROL 1.25 MG/1
1000 CAPSULE ORAL DAILY
COMMUNITY
End: 2018-02-16

## 2017-10-15 RX ORDER — HYDROCODONE BITARTRATE AND ACETAMINOPHEN 500; 5 MG/1; MG/1
TABLET ORAL
Status: DISCONTINUED | OUTPATIENT
Start: 2017-10-15 | End: 2017-10-17

## 2017-10-15 RX ADMIN — SODIUM CHLORIDE 1000 ML: 0.9 INJECTION, SOLUTION INTRAVENOUS at 09:10

## 2017-10-15 RX ADMIN — LEVOTHYROXINE SODIUM 125 MCG: 125 TABLET ORAL at 11:10

## 2017-10-15 RX ADMIN — CITALOPRAM HYDROBROMIDE 10 MG: 10 TABLET ORAL at 06:10

## 2017-10-15 RX ADMIN — PANTOPRAZOLE SODIUM 40 MG: 40 TABLET, DELAYED RELEASE ORAL at 06:10

## 2017-10-15 RX ADMIN — SODIUM CHLORIDE: 0.9 INJECTION, SOLUTION INTRAVENOUS at 10:10

## 2017-10-15 RX ADMIN — STANDARDIZED SENNA CONCENTRATE AND DOCUSATE SODIUM 1 TABLET: 8.6; 5 TABLET, FILM COATED ORAL at 09:10

## 2017-10-15 RX ADMIN — METOPROLOL TARTRATE 50 MG: 50 TABLET ORAL at 09:10

## 2017-10-15 RX ADMIN — SIMVASTATIN 20 MG: 10 TABLET, FILM COATED ORAL at 09:10

## 2017-10-15 RX ADMIN — DILTIAZEM HYDROCHLORIDE 5 MG/HR: 5 INJECTION INTRAVENOUS at 09:10

## 2017-10-15 NOTE — PLAN OF CARE
Problem: Patient Care Overview  Goal: Plan of Care Review  Pt is forgetful at times. Often have to remind her of questions already answered. Son and son n law in at bedside. Dentures obtained from [facility]. Bed alarm on. Family inquiring about positive occult stool. Explained the heart rate needs to be controlled first. Explained staff will be monitoring stool for blood. Family and pt stated understanding.

## 2017-10-15 NOTE — NURSING
Pt arrived to floor via stretcher. ambulated to bed. Weak, needs assist. Pt cold. No apparent distress or SOB noted. No complaints of pain. Vitals as noted. Bed low and locked, personal items in reach, call light and phone in reach.  fall risk reviewed. Will continue to monitor

## 2017-10-15 NOTE — ED NOTES
Pt aaox4, resp even and unlabored, skin pink warm and dry. Pt arrived with ems for fall. States she was reaching down to get something at fell. EMS report pt was orthostatic upon their arrival, systolic 107, 87, 57 and atrial fib on monitor. Pt asymptomatic at this time. Denies any pain or discomfort. Dressing to right forearm covering skin tear. Nadn. Safety maintained. Will continue to monitor. Son at bedside

## 2017-10-15 NOTE — ED PROVIDER NOTES
Encounter Date: 10/15/2017       History     Chief Complaint   Patient presents with    Tachycardia    Fall     Patient is a 89-year-old female with a past medical history of hypertension and lipidemia hypothyroidism diabetes reflux dementia and depression who presents to the emergency department from a nursing home after she slid over the out of a chair while bending over.  She has a mild skin tear to the right elbow.  The patient was noted to be tachycardic and pale.  Currently she has no complete a headache neck pain chest pain abdominal pain back pain or any complaints at all.  She has no history of atrial fibrillation or atrial flutter or SVT.          Review of patient's allergies indicates:   Allergen Reactions    Devante-1     Codeine     Novocain [procaine]      Past Medical History:   Diagnosis Date    Dementia     Depression     Diabetes mellitus     GERD (gastroesophageal reflux disease)     Hyperlipidemia     Hypertension     Thyroid disease      Past Surgical History:   Procedure Laterality Date    APPENDECTOMY      CHOLECYSTECTOMY       History reviewed. No pertinent family history.  Social History   Substance Use Topics    Smoking status: Never Smoker    Smokeless tobacco: Never Used    Alcohol use No     Review of Systems   Unable to perform ROS: Dementia       Physical Exam     Initial Vitals [10/15/17 0901]   BP Pulse Resp Temp SpO2   (!) 86/61 (!) 160 16 97 °F (36.1 °C) (!) 94 %      MAP       69.33         Physical Exam    Vitals reviewed.  Constitutional: She appears well-developed and well-nourished. No distress.   HENT:   Head: Normocephalic and atraumatic.   Right Ear: External ear normal.   Left Ear: External ear normal.   Nose: Nose normal.   Mouth/Throat: Oropharynx is clear and moist.   Eyes: Right eye exhibits no discharge. Left eye exhibits no discharge.   Pale conjunctiva   Neck: Normal range of motion. Neck supple. No JVD present.   Cardiovascular: Intact distal  pulses. Exam reveals no gallop and no friction rub.    No murmur heard.  Irregularly irregular   Pulmonary/Chest: Breath sounds normal. She has no wheezes. She has no rhonchi. She has no rales.   Abdominal: Soft. There is no tenderness. There is no rebound and no guarding.   Musculoskeletal: She exhibits no edema.   Neurological: She is alert and oriented to person, place, and time. She has normal strength. No sensory deficit.   Skin: Skin is warm and dry. Capillary refill takes less than 2 seconds. Rash noted.   Psychiatric: She has a normal mood and affect.         ED Course   Critical Care  Performed by: ASAF PEÑA  Authorized by: MARTY MACIAS   Direct patient critical care time: 15 minutes  Additional history critical care time: 5 minutes  Ordering / reviewing critical care time: 5 minutes  Documentation critical care time: 5 minutes  Consulting other physicians critical care time: 5 minutes  Consult with family critical care time: 5 minutes  Total critical care time (exclusive of procedural time) : 40 minutes  Critical care was necessary to treat or prevent imminent or life-threatening deterioration of the following conditions: circulatory failure.  Critical care was time spent personally by me on the following activities: discussions with consultants, development of treatment plan with patient or surrogate, interpretation of cardiac output measurements, evaluation of patient's response to treatment, examination of patient, ordering and review of laboratory studies, ordering and review of radiographic studies and pulse oximetry.        Labs Reviewed   CBC W/ AUTO DIFFERENTIAL - Abnormal; Notable for the following:        Result Value    RBC 2.36 (*)     Hemoglobin 7.3 (*)     Hematocrit 22.3 (*)     RDW 22.4 (*)     MPV 9.1 (*)     All other components within normal limits   COMPREHENSIVE METABOLIC PANEL - Abnormal; Notable for the following:     CO2 17 (*)     Glucose 170 (*)     Creatinine 2.1 (*)      Alkaline Phosphatase 47 (*)     Anion Gap 19 (*)     eGFR if  23 (*)     eGFR if non  20 (*)     All other components within normal limits   B-TYPE NATRIURETIC PEPTIDE - Abnormal; Notable for the following:      (*)     All other components within normal limits   TROPONIN I   PROTIME-INR   TSH   TYPE & SCREEN   PREPARE RBC SOFT             Medical Decision Making:   Patient has atrial fibrillation with rapid ventricular response and responded well to diltiazem bolus and infusion.  She is anemic and I will transfuse her.  She also has a positive rectal exam but no gross melanoma or persistent diarrhea here.  She is doing better after fluids and diltiazem infusion.  I spoke with the hospitalist who agrees with admission.  I did not give her Lovenox though because of the GI bleeding.  She also has a skin tear that I will evaluate.                   ED Course      Clinical Impression:   The primary encounter diagnosis was Blood in stool. A diagnosis of Atrial fibrillation with tachycardic ventricular rate was also pertinent to this visit.                           Praveen Turcios MD  10/15/17 1143       Praveen Turcios MD  11/01/17 0057

## 2017-10-16 LAB
ALBUMIN SERPL BCP-MCNC: 3.2 G/DL
ALP SERPL-CCNC: 42 U/L
ALT SERPL W/O P-5'-P-CCNC: 17 U/L
ANION GAP SERPL CALC-SCNC: 11 MMOL/L
ANISOCYTOSIS BLD QL SMEAR: ABNORMAL
AORTIC VALVE REGURGITATION: ABNORMAL
AORTIC VALVE STENOSIS: ABNORMAL
AST SERPL-CCNC: 25 U/L
BACTERIA #/AREA URNS HPF: ABNORMAL /HPF
BASOPHILS # BLD AUTO: 0 K/UL
BASOPHILS NFR BLD: 0.5 %
BILIRUB SERPL-MCNC: 0.6 MG/DL
BILIRUB UR QL STRIP: NEGATIVE
BUN SERPL-MCNC: 21 MG/DL
CALCIUM SERPL-MCNC: 9.6 MG/DL
CHLORIDE SERPL-SCNC: 108 MMOL/L
CLARITY UR: ABNORMAL
CO2 SERPL-SCNC: 21 MMOL/L
COLOR UR: YELLOW
CREAT SERPL-MCNC: 1.9 MG/DL
DIASTOLIC DYSFUNCTION: NO
DIFFERENTIAL METHOD: ABNORMAL
EOSINOPHIL # BLD AUTO: 0.2 K/UL
EOSINOPHIL NFR BLD: 2.6 %
ERYTHROCYTE [DISTWIDTH] IN BLOOD BY AUTOMATED COUNT: 25.1 %
EST. GFR  (AFRICAN AMERICAN): 26 ML/MIN/1.73 M^2
EST. GFR  (NON AFRICAN AMERICAN): 23 ML/MIN/1.73 M^2
ESTIMATED PA SYSTOLIC PRESSURE: 21.32
FOLATE SERPL-MCNC: 9.8 NG/ML
GIANT PLATELETS BLD QL SMEAR: PRESENT
GLUCOSE SERPL-MCNC: 61 MG/DL
GLUCOSE UR QL STRIP: NEGATIVE
HCT VFR BLD AUTO: 25.9 %
HGB BLD-MCNC: 8.6 G/DL
HGB UR QL STRIP: ABNORMAL
HYALINE CASTS #/AREA URNS LPF: 0 /LPF
IRON SERPL-MCNC: 97 UG/DL
KETONES UR QL STRIP: NEGATIVE
LEUKOCYTE ESTERASE UR QL STRIP: ABNORMAL
LYMPHOCYTES # BLD AUTO: 3 K/UL
LYMPHOCYTES NFR BLD: 39 %
MAGNESIUM SERPL-MCNC: 2.2 MG/DL
MCH RBC QN AUTO: 29.6 PG
MCHC RBC AUTO-ENTMCNC: 33.4 G/DL
MCV RBC AUTO: 89 FL
MICROSCOPIC COMMENT: ABNORMAL
MITRAL VALVE REGURGITATION: ABNORMAL
MONOCYTES # BLD AUTO: 0.6 K/UL
MONOCYTES NFR BLD: 8.1 %
NEUTROPHILS # BLD AUTO: 3.9 K/UL
NEUTROPHILS NFR BLD: 49.8 %
NITRITE UR QL STRIP: POSITIVE
OVALOCYTES BLD QL SMEAR: ABNORMAL
PH UR STRIP: 6 [PH] (ref 5–8)
PHOSPHATE SERPL-MCNC: 3.2 MG/DL
PLATELET # BLD AUTO: 203 K/UL
PLATELET BLD QL SMEAR: ABNORMAL
PMV BLD AUTO: 8.8 FL
POCT GLUCOSE: 138 MG/DL (ref 70–110)
POCT GLUCOSE: 166 MG/DL (ref 70–110)
POCT GLUCOSE: 196 MG/DL (ref 70–110)
POCT GLUCOSE: 205 MG/DL (ref 70–110)
POIKILOCYTOSIS BLD QL SMEAR: SLIGHT
POLYCHROMASIA BLD QL SMEAR: ABNORMAL
POTASSIUM SERPL-SCNC: 4 MMOL/L
PROT SERPL-MCNC: 6.6 G/DL
PROT UR QL STRIP: ABNORMAL
RBC # BLD AUTO: 2.92 M/UL
RBC #/AREA URNS HPF: 10 /HPF (ref 0–4)
RETIRED EF AND QEF - SEE NOTES: 54 (ref 55–65)
SATURATED IRON: 31 %
SODIUM SERPL-SCNC: 140 MMOL/L
SP GR UR STRIP: 1.02 (ref 1–1.03)
SQUAMOUS #/AREA URNS HPF: 4 /HPF
TOTAL IRON BINDING CAPACITY: 317 UG/DL
TRANSFERRIN SERPL-MCNC: 214 MG/DL
TRICUSPID VALVE REGURGITATION: ABNORMAL
URN SPEC COLLECT METH UR: ABNORMAL
UROBILINOGEN UR STRIP-ACNC: NEGATIVE EU/DL
VIT B12 SERPL-MCNC: 1307 PG/ML
WBC # BLD AUTO: 7.8 K/UL
WBC #/AREA URNS HPF: >100 /HPF (ref 0–5)

## 2017-10-16 PROCEDURE — G8988 SELF CARE GOAL STATUS: HCPCS | Mod: CK

## 2017-10-16 PROCEDURE — 83540 ASSAY OF IRON: CPT

## 2017-10-16 PROCEDURE — 82746 ASSAY OF FOLIC ACID SERUM: CPT

## 2017-10-16 PROCEDURE — 97110 THERAPEUTIC EXERCISES: CPT

## 2017-10-16 PROCEDURE — 99233 SBSQ HOSP IP/OBS HIGH 50: CPT | Mod: ,,, | Performed by: INTERNAL MEDICINE

## 2017-10-16 PROCEDURE — 87086 URINE CULTURE/COLONY COUNT: CPT

## 2017-10-16 PROCEDURE — 36415 COLL VENOUS BLD VENIPUNCTURE: CPT

## 2017-10-16 PROCEDURE — 97116 GAIT TRAINING THERAPY: CPT

## 2017-10-16 PROCEDURE — 83735 ASSAY OF MAGNESIUM: CPT

## 2017-10-16 PROCEDURE — G8987 SELF CARE CURRENT STATUS: HCPCS | Mod: CK

## 2017-10-16 PROCEDURE — 20600001 HC STEP DOWN PRIVATE ROOM

## 2017-10-16 PROCEDURE — 97535 SELF CARE MNGMENT TRAINING: CPT

## 2017-10-16 PROCEDURE — 80053 COMPREHEN METABOLIC PANEL: CPT

## 2017-10-16 PROCEDURE — 81000 URINALYSIS NONAUTO W/SCOPE: CPT

## 2017-10-16 PROCEDURE — 82607 VITAMIN B-12: CPT

## 2017-10-16 PROCEDURE — 87088 URINE BACTERIA CULTURE: CPT

## 2017-10-16 PROCEDURE — G8989 SELF CARE D/C STATUS: HCPCS | Mod: CK

## 2017-10-16 PROCEDURE — 85025 COMPLETE CBC W/AUTO DIFF WBC: CPT

## 2017-10-16 PROCEDURE — 97165 OT EVAL LOW COMPLEX 30 MIN: CPT

## 2017-10-16 PROCEDURE — 97162 PT EVAL MOD COMPLEX 30 MIN: CPT

## 2017-10-16 PROCEDURE — 87077 CULTURE AEROBIC IDENTIFY: CPT

## 2017-10-16 PROCEDURE — 84100 ASSAY OF PHOSPHORUS: CPT

## 2017-10-16 PROCEDURE — 25000003 PHARM REV CODE 250: Performed by: HOSPITALIST

## 2017-10-16 PROCEDURE — 94761 N-INVAS EAR/PLS OXIMETRY MLT: CPT

## 2017-10-16 PROCEDURE — 87186 SC STD MICRODIL/AGAR DIL: CPT

## 2017-10-16 RX ADMIN — PANTOPRAZOLE SODIUM 40 MG: 40 TABLET, DELAYED RELEASE ORAL at 09:10

## 2017-10-16 RX ADMIN — METOPROLOL TARTRATE 50 MG: 50 TABLET ORAL at 10:10

## 2017-10-16 RX ADMIN — FERROUS SULFATE TAB EC 325 MG (65 MG FE EQUIVALENT) 325 MG: 325 (65 FE) TABLET DELAYED RESPONSE at 07:10

## 2017-10-16 RX ADMIN — METOPROLOL TARTRATE 50 MG: 50 TABLET ORAL at 09:10

## 2017-10-16 RX ADMIN — STANDARDIZED SENNA CONCENTRATE AND DOCUSATE SODIUM 1 TABLET: 8.6; 5 TABLET, FILM COATED ORAL at 09:10

## 2017-10-16 RX ADMIN — LEVOTHYROXINE SODIUM 125 MCG: 25 TABLET ORAL at 09:10

## 2017-10-16 RX ADMIN — Medication 16 G: at 05:10

## 2017-10-16 RX ADMIN — SIMVASTATIN 20 MG: 10 TABLET, FILM COATED ORAL at 10:10

## 2017-10-16 RX ADMIN — CITALOPRAM HYDROBROMIDE 10 MG: 10 TABLET ORAL at 09:10

## 2017-10-16 RX ADMIN — STANDARDIZED SENNA CONCENTRATE AND DOCUSATE SODIUM 1 TABLET: 8.6; 5 TABLET, FILM COATED ORAL at 10:10

## 2017-10-16 NOTE — PHYSICIAN QUERY
PT Name: Lulú Eugeen  MR #: [id number]    Physician Query Form - Atrial Fibrillation Specificity     CDS/: Karie Crowell RN              Contact information:[phone]     This form is a permanent document in the medical record.     Query Date: October 16, 2017    By submitting this query, we are merely seeking further clarification of documentation. Please utilize your independent clinical judgment when addressing the question(s) below.    The medical record contains the following:   Indicators     Supporting Clinical Findings Location in Medical Record   X Atrial Fibrillation A diagnosis of Atrial fibrillation with tachycardic ventricular rate w   H&P 10/15   X EKG results EKG reveals atrial fibrillation flutter with ventricular rate of 163 BPM.  Left   axis deviation, left anterior hemiblock and right bundle-branch block.   Cardiology Consults 10/15   X Medication diltiaZEM 125 mg in D5W 125 mL infusion  5 mL/hr  :  Intravenous     Home Meds:  metoprolol tartrate (LOPRESSOR) tablet 50 mg  Oral   2 times daily MAR 10/15      MAR 10/15       X Treatment Beta Blocker and Calcium Channel Blocker. [id number] score 4. Anticoagulation not indicated currently due to patient's anemia.      H&P 10/15    Other         Provider, please further specify the Atrial Fibrillation diagnosis.    [  x] Paroxysmal  [  ] Permanent  [  ] Other (please specify): ____________________________  [  ] Clinically Undetermined    Please document in your progress notes daily for the duration of treatment until resolved, and include in your discharge summary.

## 2017-10-16 NOTE — CONSULTS
DATE OF CONSULTATION:  10/15/2017    This 89-year-old lady is admitted with a fall and atrial fibrillation with RVR.    The patient is a very poor historian.  She apparently fell at the assisted   living center, but there was no syncope.  She was in atrial fibrillation with   RVR at approximately 163 BPM.  She was treated with intravenous diltiazem and   converted back to sinus rhythm while on the floor short while ago.  The patient   was unaware of palpitations; she denies chest pain, tightness, dizziness,   syncope or shortness of breath.  The patient's daughter reports that she   possibly had atrial fibrillation at the recent admit to [facility] in June or July 2017.  She probably also has severe aortic stenosis.    PAST HISTORY:  Hypertension.  Diabetes mellitus.  Dementia.  Dyslipidemia.    GERD.  Dementia.  Fall with possible cerebral contusion versus small subdural   hematoma 08/2017.    MEDICATIONS:  Oxycodone 5 mg every 4 hours p.r.n. for pain, Celexa 10 mg daily,   ferrous sulfate 325 mg daily, furosemide 20 mg every other day.  Hydralazine 25   mg t.i.d., Norco 5 mg every 6 hours p.r.n. for pain, Lantus, Synthroid 125 mcg   daily, losartan 100 mg daily, metoprolol 50 mg b.i.d., omeprazole 40 mg daily,   Zofran 4 mg every 8 hours p.r.n. for nausea, Zocor 20 mg q.p.m.    PHYSICAL EXAMINATION:  GENERAL:  This is a well-built white lady in no acute distress.  EYES:  No scleral icterus; mild conjunctival pallor.  THROAT:  A complete upper plate is noted.  NECK:  Carotids without bruits.  There is no jugular venous distention or   thyromegaly.  CHEST:  Air entry is equal bilaterally.  There were no rales or rhonchi.  HEART:  S1, S2 are muffled.  A soft systolic murmur is audible at the base.  It   does not radiate to the neck or axilla.  ABDOMEN:  Soft, liver edge is not palpable.  EXTREMITIES:  No clubbing, cyanosis or edema.    EKG reveals atrial fibrillation flutter with ventricular rate of  163 BPM.  Left   axis deviation, left anterior hemiblock and right bundle-branch block.  Diffuse   ST-T abnormality.  Hemoglobin 7.3, hematocrit 22.3, WBC count 7300, platelet   count 258,000, granulocytes 60%, lymphocytes 30%.  PT 10.4, INR 1.0, sodium 137,   potassium 4.3, BUN 24, creatinine 2.1, total protein 7.6, albumin 3.6.  BNP   262, troponin 0.22.  .8.  Free T4 less than 0.4.  Chest x-ray reveals   calcification of the aortic knob, normal heart size, and mild increased   interstitial markings.    IMPRESSION:  1.  Atrial fibrillation with rapid ventricular response, now in sinus rhythm,   right bundle-branch block.  2.  Aortic stenosis; pulmonary hypertension; mitral annular calcification;   mitral stenosis and normal LVEF, markedly dilated left atrium on echo 08/2017.  3.  Hypertension; depression; diabetes mellitus; severe hypothyroidism.  4.  Anemia; chronic renal failure.    EKG will be repeated.  Echocardiogram was performed earlier today, but cannot be   reviewed at present -- unavailable viewing station.  Fortunately, the patient,   has converted back to sinus rhythm.  Diltiazem may be discontinued.    Anemia could be secondary to chronic kidney disease, hypothyroidism and/or blood loss -- will   need workup.  In view of her advanced age, renal dysfunction, the patient is not   a good candidate for angiography and consideration for a TAVR.    Thank you for asking me to evaluate Ms. Eugene and I will follow her along   with you.      MT/IN  dd: 10/15/2017 18:14:12 (CDT)  td: 10/15/2017 20:13:49 (CDT)  Doc ID   #[id number]  Job ID #[id number]    CC:

## 2017-10-16 NOTE — SUBJECTIVE & OBJECTIVE
Past Medical History:   Diagnosis Date    Dementia     Depression     Diabetes mellitus     GERD (gastroesophageal reflux disease)     Hyperlipidemia     Hypertension     Thyroid disease        Past Surgical History:   Procedure Laterality Date    APPENDECTOMY      CHOLECYSTECTOMY         Review of patient's allergies indicates:   Allergen Reactions    Deavnte-1     Codeine     Novocain [procaine]        No current facility-administered medications on file prior to encounter.      Current Outpatient Prescriptions on File Prior to Encounter   Medication Sig    citalopram (CELEXA) 10 MG tablet Take 10 mg by mouth once daily.    ferrous sulfate 325 mg (65 mg iron) Tab tablet Take 325 mg by mouth daily with breakfast.    furosemide (LASIX) 20 MG tablet Take 20 mg by mouth every other day.    hydrALAZINE (APRESOLINE) 25 MG tablet Take 25 mg by mouth 3 (three) times daily.    hydrocodone-acetaminophen 5-325mg (NORCO) 5-325 mg per tablet Take 1 tablet by mouth every 6 (six) hours as needed for Pain.    insulin glargine (LANTUS) 100 unit/mL injection Inject 25 Units into the skin once daily.     levothyroxine (SYNTHROID) 125 MCG tablet Take 125 mcg by mouth once daily.    losartan (COZAAR) 100 MG tablet Take 100 mg by mouth once daily.    metoprolol tartrate (LOPRESSOR) 50 MG tablet Take 50 mg by mouth 2 (two) times daily.    nitrofurantoin, macrocrystal-monohydrate, (MACROBID) 100 MG capsule Take 100 mg by mouth 2 (two) times daily.    omeprazole (PRILOSEC) 40 MG capsule Take 40 mg by mouth once daily.    ondansetron (ZOFRAN) 4 MG tablet Take 4 mg by mouth every 8 (eight) hours as needed for Nausea.    simvastatin (ZOCOR) 20 MG tablet Take 20 mg by mouth every evening.     Family History     Problem Relation (Age of Onset)    COPD Son    Heart disease Brother, Son    Hypertension Brother        Social History Main Topics    Smoking status: Never Smoker    Smokeless tobacco: Never Used    Alcohol  use No    Drug use: No    Sexual activity: Not on file     Review of Systems   Constitutional: Positive for fatigue. Negative for activity change and fever.   HENT: Negative for ear discharge, facial swelling and sore throat.    Eyes: Negative for photophobia, pain and visual disturbance.   Respiratory: Positive for cough. Negative for apnea and shortness of breath.    Cardiovascular: Positive for leg swelling. Negative for chest pain.   Gastrointestinal: Negative for abdominal pain and blood in stool.   Endocrine: Negative for cold intolerance and heat intolerance.   Musculoskeletal: Negative for back pain and gait problem.   Skin: Negative for pallor and rash.   Neurological: Positive for weakness. Negative for speech difficulty and headaches.   Psychiatric/Behavioral: Negative for confusion, hallucinations and suicidal ideas.   All other systems reviewed and are negative.    Objective:     Vital Signs (Most Recent):  Temp: 98.1 °F (36.7 °C) (10/15/17 1933)  Pulse: 78 (10/15/17 1933)  Resp: 18 (10/15/17 1933)  BP: (!) 106/57 (10/15/17 1933)  SpO2: 98 % (10/15/17 1933) Vital Signs (24h Range):  Temp:  [97 °F (36.1 °C)-98.4 °F (36.9 °C)] 98.1 °F (36.7 °C)  Pulse:  [] 78  Resp:  [16-20] 18  SpO2:  [91 %-100 %] 98 %  BP: ()/(52-77) 106/57     Weight: 54 kg (119 lb)  Body mass index is 21.77 kg/m².    Physical Exam   Constitutional: She is oriented to person, place, and time.   Elderly female in NAD   HENT:   Head: Normocephalic and atraumatic.   Eyes: EOM are normal. Pupils are equal, round, and reactive to light.   Neck: Neck supple. No JVD present.   Cardiovascular: Normal rate and regular rhythm.  Exam reveals no gallop and no friction rub.    Murmur (3/6 HSM) heard.  Pulmonary/Chest: Effort normal. She has no wheezes. She has rales.   Abdominal: Soft. Bowel sounds are normal. She exhibits no distension. There is no tenderness.   Musculoskeletal: She exhibits edema. She exhibits no tenderness.    Lymphadenopathy:     She has no cervical adenopathy.   Neurological: She is alert and oriented to person, place, and time. She has normal reflexes. No cranial nerve deficit.   Skin: No rash noted. No erythema.   Psychiatric: She has a normal mood and affect.   Nursing note and vitals reviewed.       Significant Labs: All pertinent labs within the past 24 hours have been reviewed.    Significant Imaging: I have reviewed all pertinent imaging results/findings within the past 24 hours.

## 2017-10-16 NOTE — ASSESSMENT & PLAN NOTE
Chronic, controlled.  Monitor BP closely.  Will continue BP medications as needed for sustained BP control.

## 2017-10-16 NOTE — ASSESSMENT & PLAN NOTE
Patient's anemia is currently uncontrolled. S/p 2 units of PRBCs.  Current CBC reviewed-   Lab Results   Component Value Date    HGB 7.3 (L) 10/15/2017    HCT 22.3 (L) 10/15/2017    MCV 94 10/15/2017     10/15/2017     Monitor serial CBC and transfuse if patient becomes hemodynamically unstable, symptomatic or H/H drops below 7/21.

## 2017-10-16 NOTE — ASSESSMENT & PLAN NOTE
Atrial Fibrillation uncontrolled currently with Beta Blocker and Calcium Channel Blocker. [id number] score 4. Anticoagulation not indicated currently due to patient's anemia.

## 2017-10-16 NOTE — PT/OT/SLP EVAL
Physical Therapy  Evaluation    Lulú Eugene   MRN: [id number]   Admitting Diagnosis: Atrial fibrillation with tachycardic ventricular rate    PT Received On: 10/16/17  PT Start Time: 0946     PT Stop Time: 1016    PT Total Time (min): 30 min       Billable Minutes:  Evaluation 10, Gait [id number] and Therapeutic Exercise 10    Diagnosis: Atrial fibrillation with tachycardic ventricular rate      Past Medical History:   Diagnosis Date    Dementia     Depression     Diabetes mellitus     GERD (gastroesophageal reflux disease)     Hyperlipidemia     Hypertension     Thyroid disease       Past Surgical History:   Procedure Laterality Date    APPENDECTOMY      CHOLECYSTECTOMY         Referring physician:   Date referred to PT: 10-    General Precautions: Standard, fall  Orthopedic Precautions: N/A   Braces:              Patient History:  Lives With: alone  Living Arrangements: assisted living  Equipment Currently Used at Home: rollator, wheelchair  DME owned (not currently used):     Previous Level of Function:  Ambulation Skills: needs device  Transfer Skills: needs device  ADL Skills: needs device    Subjective:  Communicated with nurse lisa prior to session.  Pt alert, interactive, finishing therapy with ROSANNA Batista  Pt stated feeling well  Daughter stated pt is almost back to baseline now, was pale and weak yesterday  Chief Complaint: no complaints  Patient goals: get well    Pain/Comfort  Pain Rating 1: 0/10      Objective:   Patient found with: telemetry ([employer])     Cognitive Exam:  Oriented to: Person and Place    Follows Commands/attention: Follows two-step commands  Communication: clear/fluent  Safety awareness/insight to disability: impaired    Physical Exam:  Postural examination/scapula alignment: Rounded shoulder and Head forward    Skin integrity: Visible skin intact  Edema: None noted     Sensation:   Intact    Upper Extremity Range of Motion:  Right Upper Extremity: WFL  Left  Upper Extremity: WFL    Upper Extremity Strength:  Right Upper Extremity: WFL  Left Upper Extremity: WFL    Lower Extremity Range of Motion:  Right Lower Extremity: WFL  Left Lower Extremity: WFL    Lower Extremity Strength:  Right Lower Extremity: 4-/5  Left Lower Extremity: 4-/5       Functional Mobility:  Bed Mobility:       Transfers:  Sit <> Stand Assistance: Contact Guard Assistance  Sit <> Stand Assistive Device: Rolling Walker  Bed <> Chair Technique: Stand Pivot  Bed <> Chair Assistance: Minimum Assistance  Bed <> Chair Assistive Device: Rolling Walker    Gait:   Gait Distance: 240ft  Assistance 1: Minimum assistance  Gait Assistive Device: Rolling walker  Gait Deviation(s): decreased ela, decreased velocity of limb motion, decreased step length, decreased stride length    Stairs:      Balance:   Static Sit: GOOD-: Takes MODERATE challenges from all directions but inconsistently  Dynamic Sit: GOOD-: Maintains balance through MODERATE excursions of active trunk movement,     Static Stand: FAIR: Maintains without assist but unable to take challenges  Dynamic stand: FAIR: Needs CONTACT GUARD during gait    Therapeutic Activities and Exercises:  Pt seen up in chair  Sit to stand CGA with instructions for safety and push off from chair  Gait to hallways with assist for direction  Back to chair and completed LE thera ex x 10 reps with AP,LAQ, marches  Tray table in front- pt likes crossing legs    AM-PAC 6 CLICK MOBILITY  How much help from another person does this patient currently need?   1 = Unable, Total/Dependent Assistance  2 = A lot, Maximum/Moderate Assistance  3 = A little, Minimum/Contact Guard/Supervision  4 = None, Modified [place]/Independent          AM-PAC Raw Score CMS G-Code Modifier Level of Impairment Assistance   6 % Total / Unable   7 - 9 CM 80 - 100% Maximal Assist   10 - 14 CL 60 - 80% Moderate Assist   15 - 19 CK 40 - 60% Moderate Assist   20 - 22 CJ 20 - 40% Minimal  Assist   23 CI 1-20% SBA / CGA   24 CH 0% Independent/ Mod I     Patient left up in chair with all lines intact, call button in reach and daughter Mayra present.    Assessment:   Lulú Eugene is a 89 y.o. female with a medical diagnosis of Atrial fibrillation with tachycardic ventricular rate and presents with impaired mobility. Daughter at bedside who stated pt lived at [facility] and presented with increasing weakness and assist for mobility x 1 month. Daughter is pleased at how well pt  Is doing now  walking and  talking after receiving blood transfusion yesterday. Pt to benefit from continued therapies    Rehab identified problem list/impairments: Rehab identified problem list/impairments: weakness, impaired endurance, impaired self care skills, impaired functional mobilty, decreased safety awareness, impaired cardiopulmonary response to activity    Rehab potential is fair.    Activity tolerance: Fair    Discharge recommendations: Discharge Facility/Level Of Care Needs: home health PT     Barriers to discharge:      Equipment recommendations:       GOALS:    Physical Therapy Goals        Problem: Physical Therapy Goal    Goal Priority Disciplines Outcome Goal Variances Interventions   Physical Therapy Goal    High PT/OT, PT      Description:  Goals to be met by: 10-     Patient will increase functional independence with mobility by performin. Sit to stand transfer with Contact Guard Assistance  2. Bed to chair transfer with Contact Guard Assistance using Rolling Walker  3. Gait  x 300 feet with Contact Guard Assistance using Rolling Walker.   4. Lower extremity exercise program x20 reps per handout, with assistance as needed                      PLAN:    Patient to be seen 6 x/week to address the above listed problems via gait training, therapeutic activities, therapeutic exercises  Plan of Care expires: 10/27/17  Plan of Care reviewed with: patient, daughter          Caro  Sy, PT  10/16/2017

## 2017-10-16 NOTE — PT/OT/SLP EVAL
Occupational Therapy  Evaluation, Treatment & Discharge Note    Lulú Eugene   MRN: [id number]   Admitting Diagnosis: Atrial fibrillation with tachycardic ventricular rate    OT Date of Treatment: 10/16/17   OT Start Time: 0925  OT Stop Time: 0952  OT Total Time (min): 27 min    Billable Minutes:  Evaluation 12  Self Care/Home Management 15    Diagnosis: Atrial fibrillation with tachycardic ventricular rate   S/p fall from bed (at [facility])    Past Medical History:   Diagnosis Date    Dementia     Depression     Diabetes mellitus     GERD (gastroesophageal reflux disease)     Hyperlipidemia     Hypertension     Thyroid disease       Past Surgical History:   Procedure Laterality Date    APPENDECTOMY      CHOLECYSTECTOMY         Referring physician: Rodríguez  Date referred to OT: 10/15/17    General Precautions: Standard, fall  Orthopedic Precautions: N/A  Braces: N/A          Patient History:  Living Environment  Lives With: facility resident  Living Arrangements: assisted living  Living Environment Comment: Pt resides at [facility] and has assistance available as needed.  Equipment Currently Used at Home: rollator, wheelchair    Prior level of function:   Bed Mobility/Transfers: needs device and assist  Grooming: needs assist  Bathing: needs device and assist  Upper Body Dressing: needs assist  Lower Body Dressing: needs assist  Toileting: needs device and assist  Home Management Skills: unable to perform  Homemaking Responsibilities: No  Driving License: No  Mode of Transportation: Family  IADL Comments: PTA, pt was at [facility] and received assistance for all self care except that she could feed herself with set-up and supervision and encouragement. Pt has been using a wheelchair the past month due to her legs weakening. Before that she used a rollator independently.     Dominant hand: right    Subjective:  Communicated with nurse Jenn prior to session.  Stated patient was cleared for OT today.  Chief  "Complaint: none  Patient/Family stated goals: " I am not sure."    Pain/Comfort  Pain Rating 1: 0/10  Pain Rating Post-Intervention 1: 0/10    Objective:  Patient found with: telemetry ([employer])    Cognitive Exam:  Oriented to: Person, Place and Situation  Follows Commands/attention: Follows one-step commands and Follows two-step commands  Communication: clear/fluent  Memory:  Impaired STM, Impaired LTM and Poor immediate recall  Safety awareness/insight to disability: impaired  Coping skills/emotional control: Appropriate to situation    Visual/perceptual:  Intact    Physical Exam:  Postural examination/scapula alignment: Rounded shoulder, Head forward and Posterior pelvic tilt  Skin integrity: Visible skin intact  Edema: None noted     Sensation:   Intact    Upper Extremity Range of Motion:  Right Upper Extremity: WFL  Left Upper Extremity: WFL    Upper Extremity Strength:  Right Upper Extremity: WFL  Left Upper Extremity: WFL   Strength: WFL    Fine motor coordination:   Intact    Gross motor coordination: WFL    Functional Mobility:  Bed Mobility:  Rolling/Turning Right: Stand by assistance, With side rail  Scooting/Bridging: Stand by Assistance, With side rail  Supine to Sit: Stand by Assistance, WIth side rail    Transfers:  Sit <> Stand Assistance: Contact Guard Assistance (cues for hand placement)  Sit <> Stand Assistive Device: Rolling Walker  Bed <> Chair Technique: Stand Pivot  Bed <> Chair Transfer Assistance: Contact Guard Assistance (cues for hand placement)  Bed <> Chair Assistive Device: Rolling Walker    Activities of Daily Living:    LE Dressing Level of Assistance: Stand by assistance, Set-up Assistance (seated at EOB to don/doff both socks)    Grooming Position: Seated  Grooming Level of Assistance: Minimum assistance (to appropriately adjust wig on her head)    Balance:   Static Sit: GOOD: Takes MODERATE challenges from all directions  Dynamic Sit: GOOD: Maintains balance through MODERATE " "excursions of active trunk movement  Static Stand: FAIR: Maintains without assist but unable to take challenges  Dynamic stand: FAIR: Needs CONTACT GUARD during gait    Therapeutic Activities and Exercises:  OT ed pt on OT role & POC as well as discharge recommendations.  Encouragement needed for pt to agree to get OOB. OT ed pt on benefits of getting OOB & up to chair to preserve endurance and minimize effects of prolonged bedrest.   OT ed patient on safety with walker use for functional mobility with cues for hand placement & sequencing with sit<>stand and stand pivot transfers.   OT ed pt on fall risk and strongly advised pt to call for help for all OOB mobility.      AM-PAC 6 CLICK ADL  How much help from another person does this patient currently need?  1 = Unable, Total/Dependent Assistance  2 = A lot, Maximum/Moderate Assistance  3 = A little, Minimum/Contact Guard/Supervision  4 = None, Modified [place]/Independent    Putting on and taking off regular lower body clothing? : 3  Bathing (including washing, rinsing, drying)?: 3  Toileting, which includes using toilet, bedpan, or urinal? : 2  Putting on and taking off regular upper body clothing?: 3  Taking care of personal grooming such as brushing teeth?: 3  Eating meals?: 3  Total Score: 17    AM-PAC Raw Score CMS "G-Code Modifier Level of Impairment Assistance   6 % Total / Unable   7 - 9 CM 80 - 100% Maximal Assist   10-14 CL 60 - 80% Moderate Assist   15 - 19 CK 40 - 60% Moderate Assist   20 - 22 CJ 20 - 40% Minimal Assist   23 CI 1-20% SBA / CGA   24 CH 0% Independent/ Mod I       Patient left up in chair with all lines intact, call button in reach, Jenn, nurse notified and Mary, PT and pt's daughter present    Assessment:  Lulú Eugene is a 89 y.o. female with a medical diagnosis of Atrial fibrillation with tachycardic ventricular rate and presents with some deficits in functional status due to the below listed impairments, impacting " ADLs and functional mobility.  However, due to dementia, pt is functioning at her baseline and maximum potential with self care tasks.  No further OT needs at this time.    Rehab identified problem list/impairments: Rehab identified problem list/impairments: weakness, impaired self care skills, decreased safety awareness, impaired cardiopulmonary response to activity, impaired endurance, impaired functional mobilty, impaired cognition    Rehab potential is fair.    Activity tolerance: Fair    Discharge recommendations: Discharge Facility/Level Of Care Needs: home health PT     Barriers to discharge: Barriers to Discharge: None    Equipment recommendations: none     GOALS:    Occupational Therapy Goals     Not on file                PLAN:  Patient to discharged from OT services.  Plan of Care reviewed with: patient, daughter    OT G-codes  Functional Assessment Tool Used: [facility]  Score: 17  Functional Limitation: Self care  Self Care Current Status (): OLMAN  Self Care Goal Status (): OLMAN  Self Care Discharge Status (): DAVID Klein  10/16/2017

## 2017-10-16 NOTE — PLAN OF CARE
Problem: Patient Care Overview  Goal: Plan of Care Review  PT eval and treat completed. Gait training 240ft with NIXNO ALCARAZ to chair, thera ex x 10 reps lE's

## 2017-10-16 NOTE — ASSESSMENT & PLAN NOTE
Chronic problem, controlled. Will continue chronic medication(s), and monitor for any changes, adjusting as needed.

## 2017-10-16 NOTE — PLAN OF CARE
I completed the assessment with the pt and her daughter and DEZ Pederson [phone]. The pt lives at [facility] and has services from [facility]. The pt was recently hospitalized on 8/11/17 at [facility] for a fall. She returned home and resumed services. Dacia stated that once discharged they want to resume hospice again. The pt has a home wheelchair and rollator. She uses [facility]. Her PCP is Dr. Del Rio with [facility]. The pt has medicare and  for life insurance. I wrote my name and number on the pts board and educated them on the blue discharge folder.      10/16/17 1223   Discharge Assessment   Assessment Type Discharge Planning Assessment   Confirmed/corrected address and phone number on facesheet? Yes   Assessment information obtained from? Patient;Caregiver;Medical Record   Communicated expected length of stay with patient/caregiver no   Prior to hospitilization cognitive status: Alert/Oriented   Prior to hospitalization functional status: Assistive Equipment   Current cognitive status: Alert/Oriented   Current Functional Status: Assistive Equipment   Lives With other (see comments)  ([facility] )   Able to Return to Prior Arrangements yes   Is patient able to care for self after discharge? No   Who are your caregiver(s) and their phone number(s)? Daughter and DEZ Nunez [phone]   Readmission Within The Last 30 Days no previous admission in last 30 days   Patient currently being followed by outpatient case management? No   Patient currently receives any other outside agency services? Yes   Name and contact number of agency or person providing outside services [facility]    Is it the patient/care giver preference to resume care with the current outside agency? Yes   Equipment Currently Used at Home wheelchair;rollator   Do you have any problems affording any of your prescribed medications? No   Is the patient taking medications as prescribed? yes   Does the patient have  transportation home? Yes   Transportation Available family or friend will provide   Does the patient receive services at the [facility]? No   Discharge Plan A Home;Hospice/home   Discharge Plan B Home with family   Patient/Family In Agreement With Plan yes

## 2017-10-16 NOTE — PROGRESS NOTES
Progress Note  Hospital Medicine  Patient Name:Lulú Eugene  MRN:  [id number]  Patient Class: IP- Inpatient  Admit Date: 10/15/2017  Length of Stay: 1 days  Expected Discharge Date:   Attending Physician: Irina Alford MD  Primary Care Provider:  Hayes Jean MD    SUBJECTIVE:     Principal Problem: Atrial fibrillation with tachycardic ventricular rate  Initial history of present illness: Patient is a 89-year-old female with a past medical history of hypertension and lipidemia hypothyroidism diabetes reflux dementia and depression who presents to the emergency department from a nursing home after she slid over the out of a chair while bending over.  She has a mild skin tear to the right elbow.  The patient was noted to be tachycardic and pale.  Currently she has no complete a headache neck pain chest pain abdominal pain back pain or any complaints at all.  She has no history of atrial fibrillation or atrial flutter or SVT. Patient was found to be anemic in ED and was transfused 1 unit RBCs. She was started on cardizem gtt in ED and converted to NSR after transfusion. Patient remains asymptomatic.    PMH/PSH/SH/FH/Meds: reviewed.    Symptoms/Review of Systems: In NSR, rate controlled. Feeling better with PRBC. No shortness of breath, cough, chest pain or headache, fever or abdominal pain. Appetite improving.  Diet:  Adequate intake.    Activity level: Up with assistance  Pain:  Patient reports no pain.       OBJECTIVE:   Vital Signs (Most Recent):      Temp: 98.4 °F (36.9 °C) (10/16/17 0718)  Pulse: 63 (10/16/17 0718)  Resp: 16 (10/16/17 0718)  BP: 133/65 (10/16/17 0718)  SpO2: 100 % (10/16/17 0718)       Vital Signs Range (Last 24H):  Temp:  [97.1 °F (36.2 °C)-98.4 °F (36.9 °C)]   Pulse:  []   Resp:  [16-20]   BP: ()/(52-77)   SpO2:  [98 %-100 %]     Weight: 54 kg (119 lb)  Body mass index is 21.77 kg/m².    Intake/Output Summary (Last 24 hours) at 10/16/17 8609  Last data filed at 10/16/17 6966    Gross per 24 hour   Intake           510.58 ml   Output                0 ml   Net           510.58 ml     Physical Examination:  Constitutional: She is oriented to person, place, and time.   Elderly female in NAD   HENT:   Head: Normocephalic and atraumatic.   Eyes: EOM are normal. Pupils are equal, round, and reactive to light.   Neck: Neck supple. No JVD present.   Cardiovascular: Normal rate and regular rhythm.  Exam reveals no gallop and no friction rub.    Murmur (3/6 HSM) heard.  Pulmonary/Chest: Effort normal. She has no wheezes. She has rales.   Abdominal: Soft. Bowel sounds are normal. She exhibits no distension. There is no tenderness.   Musculoskeletal: She exhibits edema. She exhibits no tenderness.   Lymphadenopathy:     She has no cervical adenopathy.   Neurological: She is alert and oriented to person, place, and time. She has normal reflexes. No cranial nerve deficit.   Skin: No rash noted. No erythema.   Psychiatric: She has a normal mood and affect.   Nursing note and vitals reviewed.    CBC:    Recent Labs  Lab 10/15/17  0911 10/16/17  0424   WBC 7.30 7.80   RBC 2.36* 2.92*   HGB 7.3* 8.6*   HCT 22.3* 25.9*    203   MCV 94 89   MCH 30.7 29.6   MCHC 32.6 33.4   BMP    Recent Labs  Lab 10/15/17  0911 10/16/17  0424   * 61*    140   K 4.3 4.0    108   CO2 17* 21*   BUN 24 21   CREATININE 2.1* 1.9*   CALCIUM 9.9 9.6   MG  --  2.2      Diagnostic Results:  Microbiology Results (last 7 days)     Procedure Component Value Units Date/Time    Urine Culture [[id number]]     Order Status:  No result Specimen:  Urine          CXR: Atherosclerosis otherwise negative portable chest    ECHO: Pending.    Assessment/Plan:     * Atrial fibrillation with tachycardic ventricular rate     [id number] score 4.  Converted to NSR.  On Metoprolol 50 BID. No anticoagulation due to anemia. Dr. AIDAN Dawn following.         Adult myxedema     Patient with severe hypothyroidism. Increase PO  levothyroxine and monitor closely.          Iron deficiency anemia due to chronic blood loss     Patient's anemia is currently uncontrolled. S/p 2 units of PRBCs.  Monitor serial CBC and transfuse if patient becomes hemodynamically unstable, symptomatic or H/H drops below 7/21.  Check Iron, TIBC, B12, Folate and FOBT.         Depression     Chronic problem, controlled. Will continue chronic medication(s), and monitor for any changes, adjusting as needed.              Nonrheumatic aortic valve stenosis     Dr. AIDAN Dawn following. Await ECHO results.          Type 2 diabetes mellitus without complication     Patient's FSGs are controlled on current hypoglycemics.   Last A1c reviewed-           Lab Results   Component Value Date     HGBA1C 7.0 (H) 08/11/2017      Most recent fingerstick glucose reviewed-   Recent Labs  Lab 10/15/17  2002   POCTGLUCOSE 190*      Current correctional scale  Low  Maintain insulin dose as follows- Insulin SSI             Essential hypertension     Chronic, controlled.  Monitor BP closely.  Will continue BP medications as needed for sustained BP control.     Discussed with p[atient's daughter at length who confirms DNR code and also hayley dahl, patient was at hospice care and not interested in invasive testing. Therefore will defer endoscopic evaluation. Continue PT. Time spent in care of the patient, counseling and coordination of care (Greater than 50% spent in direct face to face contact): 35 min.    VTE Risk Mitigation         Ordered     Medium Risk of VTE  Once      10/15/17 1331     Place YUE hose  Until discontinued      10/15/17 1331     Place sequential compression device  Until discontinued      10/15/17 1331        Irina Alford MD  Department of Hospital Medicine   [facility]

## 2017-10-16 NOTE — ASSESSMENT & PLAN NOTE
Patient's FSGs are controlled on current hypoglycemics.   Last A1c reviewed-   Lab Results   Component Value Date    HGBA1C 7.0 (H) 08/11/2017     Most recent fingerstick glucose reviewed-   Recent Labs  Lab 10/15/17  2002   POCTGLUCOSE 190*     Current correctional scale  Low  Maintain insulin dose as follows- Insulin SSI

## 2017-10-16 NOTE — H&P
[facility] Medicine  History & Physical    Patient Name: Lulú Eugene  MRN: [id number]  Admission Date: 10/15/2017  Attending Physician: Oscar Arreguin MD  Primary Care Provider: Hayes Jean MD         Patient information was obtained from patient and ER records.     Subjective:     Principal Problem:Atrial fibrillation with tachycardic ventricular rate    Chief Complaint:   Chief Complaint   Patient presents with    Tachycardia    Fall        HPI: Patient is a 89-year-old female with a past medical history of hypertension and lipidemia hypothyroidism diabetes reflux dementia and depression who presents to the emergency department from a nursing home after she slid over the out of a chair while bending over.  She has a mild skin tear to the right elbow.  The patient was noted to be tachycardic and pale.  Currently she has no complete a headache neck pain chest pain abdominal pain back pain or any complaints at all.  She has no history of atrial fibrillation or atrial flutter or SVT. Patient was found to be anemic in ED and was transfused 1 unit RBCs. She was started on cardizem gtt in ED and converted to NSR after transfusion. Patient remains asymptomatic.    Past Medical History:   Diagnosis Date    Dementia     Depression     Diabetes mellitus     GERD (gastroesophageal reflux disease)     Hyperlipidemia     Hypertension     Thyroid disease        Past Surgical History:   Procedure Laterality Date    APPENDECTOMY      CHOLECYSTECTOMY         Review of patient's allergies indicates:   Allergen Reactions    Devante-1     Codeine     Novocain [procaine]        No current facility-administered medications on file prior to encounter.      Current Outpatient Prescriptions on File Prior to Encounter   Medication Sig    citalopram (CELEXA) 10 MG tablet Take 10 mg by mouth once daily.    ferrous sulfate 325 mg (65 mg iron) Tab tablet Take 325 mg by mouth daily with  breakfast.    furosemide (LASIX) 20 MG tablet Take 20 mg by mouth every other day.    hydrALAZINE (APRESOLINE) 25 MG tablet Take 25 mg by mouth 3 (three) times daily.    hydrocodone-acetaminophen 5-325mg (NORCO) 5-325 mg per tablet Take 1 tablet by mouth every 6 (six) hours as needed for Pain.    insulin glargine (LANTUS) 100 unit/mL injection Inject 25 Units into the skin once daily.     levothyroxine (SYNTHROID) 125 MCG tablet Take 125 mcg by mouth once daily.    losartan (COZAAR) 100 MG tablet Take 100 mg by mouth once daily.    metoprolol tartrate (LOPRESSOR) 50 MG tablet Take 50 mg by mouth 2 (two) times daily.    nitrofurantoin, macrocrystal-monohydrate, (MACROBID) 100 MG capsule Take 100 mg by mouth 2 (two) times daily.    omeprazole (PRILOSEC) 40 MG capsule Take 40 mg by mouth once daily.    ondansetron (ZOFRAN) 4 MG tablet Take 4 mg by mouth every 8 (eight) hours as needed for Nausea.    simvastatin (ZOCOR) 20 MG tablet Take 20 mg by mouth every evening.     Family History     Problem Relation (Age of Onset)    COPD Son    Heart disease Brother, Son    Hypertension Brother        Social History Main Topics    Smoking status: Never Smoker    Smokeless tobacco: Never Used    Alcohol use No    Drug use: No    Sexual activity: Not on file     Review of Systems   Constitutional: Positive for fatigue. Negative for activity change and fever.   HENT: Negative for ear discharge, facial swelling and sore throat.    Eyes: Negative for photophobia, pain and visual disturbance.   Respiratory: Positive for cough. Negative for apnea and shortness of breath.    Cardiovascular: Positive for leg swelling. Negative for chest pain.   Gastrointestinal: Negative for abdominal pain and blood in stool.   Endocrine: Negative for cold intolerance and heat intolerance.   Musculoskeletal: Negative for back pain and gait problem.   Skin: Negative for pallor and rash.   Neurological: Positive for weakness. Negative for  speech difficulty and headaches.   Psychiatric/Behavioral: Negative for confusion, hallucinations and suicidal ideas.   All other systems reviewed and are negative.    Objective:     Vital Signs (Most Recent):  Temp: 98.1 °F (36.7 °C) (10/15/17 1933)  Pulse: 78 (10/15/17 1933)  Resp: 18 (10/15/17 1933)  BP: (!) 106/57 (10/15/17 1933)  SpO2: 98 % (10/15/17 1933) Vital Signs (24h Range):  Temp:  [97 °F (36.1 °C)-98.4 °F (36.9 °C)] 98.1 °F (36.7 °C)  Pulse:  [] 78  Resp:  [16-20] 18  SpO2:  [91 %-100 %] 98 %  BP: ()/(52-77) 106/57     Weight: 54 kg (119 lb)  Body mass index is 21.77 kg/m².    Physical Exam   Constitutional: She is oriented to person, place, and time.   Elderly female in NAD   HENT:   Head: Normocephalic and atraumatic.   Eyes: EOM are normal. Pupils are equal, round, and reactive to light.   Neck: Neck supple. No JVD present.   Cardiovascular: Normal rate and regular rhythm.  Exam reveals no gallop and no friction rub.    Murmur (3/6 HSM) heard.  Pulmonary/Chest: Effort normal. She has no wheezes. She has rales.   Abdominal: Soft. Bowel sounds are normal. She exhibits no distension. There is no tenderness.   Musculoskeletal: She exhibits edema. She exhibits no tenderness.   Lymphadenopathy:     She has no cervical adenopathy.   Neurological: She is alert and oriented to person, place, and time. She has normal reflexes. No cranial nerve deficit.   Skin: No rash noted. No erythema.   Psychiatric: She has a normal mood and affect.   Nursing note and vitals reviewed.       Significant Labs: All pertinent labs within the past 24 hours have been reviewed.    Significant Imaging: I have reviewed all pertinent imaging results/findings within the past 24 hours.    Assessment/Plan:     * Atrial fibrillation with tachycardic ventricular rate    Atrial Fibrillation uncontrolled currently with Beta Blocker and Calcium Channel Blocker. [id number] score 4. Anticoagulation not indicated currently due to  patient's anemia.          Adult myxedema    Patient with severe hypothyroidism. Will continue PO levothyroxine and monitor closely.          Iron deficiency anemia due to chronic blood loss    Patient's anemia is currently uncontrolled. S/p 2 units of PRBCs.  Current CBC reviewed-   Lab Results   Component Value Date    HGB 7.3 (L) 10/15/2017    HCT 22.3 (L) 10/15/2017    MCV 94 10/15/2017     10/15/2017     Monitor serial CBC and transfuse if patient becomes hemodynamically unstable, symptomatic or H/H drops below 7/21.             Depression    Chronic problem, controlled. Will continue chronic medication(s), and monitor for any changes, adjusting as needed.             Nonrheumatic aortic valve stenosis    Assess ECHO and consult cardiology.          Type 2 diabetes mellitus without complication    Patient's FSGs are controlled on current hypoglycemics.   Last A1c reviewed-   Lab Results   Component Value Date    HGBA1C 7.0 (H) 08/11/2017     Most recent fingerstick glucose reviewed-   Recent Labs  Lab 10/15/17  2002   POCTGLUCOSE 190*     Current correctional scale  Low  Maintain insulin dose as follows- Insulin SSI            Essential hypertension    Chronic, controlled.  Monitor BP closely.  Will continue BP medications as needed for sustained BP control.              VTE Risk Mitigation         Ordered     Medium Risk of VTE  Once      10/15/17 1331     Place YUE hose  Until discontinued      10/15/17 1331     Place sequential compression device  Until discontinued      10/15/17 1331             Oscar Arreguin MD  Department of Hospital Medicine   [facility]

## 2017-10-17 VITALS
HEIGHT: 62 IN | HEART RATE: 58 BPM | TEMPERATURE: 98 F | WEIGHT: 119 LBS | BODY MASS INDEX: 21.9 KG/M2 | OXYGEN SATURATION: 99 % | SYSTOLIC BLOOD PRESSURE: 146 MMHG | RESPIRATION RATE: 18 BRPM | DIASTOLIC BLOOD PRESSURE: 81 MMHG

## 2017-10-17 PROBLEM — K92.1 BLOOD IN STOOL: Status: ACTIVE | Noted: 2017-10-17

## 2017-10-17 PROBLEM — E03.9 HYPOTHYROIDISM: Status: ACTIVE | Noted: 2017-10-17

## 2017-10-17 LAB
ALBUMIN SERPL BCP-MCNC: 2.9 G/DL
ALP SERPL-CCNC: 40 U/L
ALT SERPL W/O P-5'-P-CCNC: 14 U/L
ANION GAP SERPL CALC-SCNC: 9 MMOL/L
AST SERPL-CCNC: 19 U/L
BASOPHILS # BLD AUTO: 0 K/UL
BASOPHILS NFR BLD: 0.8 %
BILIRUB SERPL-MCNC: 0.4 MG/DL
BLD PROD TYP BPU: NORMAL
BLD PROD TYP BPU: NORMAL
BLOOD UNIT EXPIRATION DATE: NORMAL
BLOOD UNIT EXPIRATION DATE: NORMAL
BLOOD UNIT TYPE CODE: 600
BLOOD UNIT TYPE CODE: 6200
BLOOD UNIT TYPE: NORMAL
BLOOD UNIT TYPE: NORMAL
BUN SERPL-MCNC: 20 MG/DL
CALCIUM SERPL-MCNC: 9.1 MG/DL
CHLORIDE SERPL-SCNC: 105 MMOL/L
CO2 SERPL-SCNC: 22 MMOL/L
CODING SYSTEM: NORMAL
CODING SYSTEM: NORMAL
CREAT SERPL-MCNC: 1.8 MG/DL
DIFFERENTIAL METHOD: ABNORMAL
DISPENSE STATUS: NORMAL
DISPENSE STATUS: NORMAL
EOSINOPHIL # BLD AUTO: 0.2 K/UL
EOSINOPHIL NFR BLD: 3.3 %
ERYTHROCYTE [DISTWIDTH] IN BLOOD BY AUTOMATED COUNT: 24.2 %
EST. GFR  (AFRICAN AMERICAN): 28 ML/MIN/1.73 M^2
EST. GFR  (NON AFRICAN AMERICAN): 24 ML/MIN/1.73 M^2
GLUCOSE SERPL-MCNC: 131 MG/DL
HCT VFR BLD AUTO: 23.9 %
HGB BLD-MCNC: 8 G/DL
LYMPHOCYTES # BLD AUTO: 1.6 K/UL
LYMPHOCYTES NFR BLD: 30.8 %
MAGNESIUM SERPL-MCNC: 2 MG/DL
MCH RBC QN AUTO: 29.7 PG
MCHC RBC AUTO-ENTMCNC: 33.3 G/DL
MCV RBC AUTO: 89 FL
MONOCYTES # BLD AUTO: 0.4 K/UL
MONOCYTES NFR BLD: 7.6 %
NEUTROPHILS # BLD AUTO: 2.9 K/UL
NEUTROPHILS NFR BLD: 57.5 %
NUM UNITS TRANS PACKED RBC: NORMAL
NUM UNITS TRANS PACKED RBC: NORMAL
PHOSPHATE SERPL-MCNC: 3.5 MG/DL
PLATELET # BLD AUTO: 174 K/UL
PMV BLD AUTO: 8.6 FL
POCT GLUCOSE: 141 MG/DL (ref 70–110)
POCT GLUCOSE: 173 MG/DL (ref 70–110)
POTASSIUM SERPL-SCNC: 3.9 MMOL/L
PROT SERPL-MCNC: 6.1 G/DL
RBC # BLD AUTO: 2.68 M/UL
SODIUM SERPL-SCNC: 136 MMOL/L
WBC # BLD AUTO: 5.1 K/UL

## 2017-10-17 PROCEDURE — 36415 COLL VENOUS BLD VENIPUNCTURE: CPT

## 2017-10-17 PROCEDURE — 97110 THERAPEUTIC EXERCISES: CPT

## 2017-10-17 PROCEDURE — 94760 N-INVAS EAR/PLS OXIMETRY 1: CPT

## 2017-10-17 PROCEDURE — 85025 COMPLETE CBC W/AUTO DIFF WBC: CPT

## 2017-10-17 PROCEDURE — 99239 HOSP IP/OBS DSCHRG MGMT >30: CPT | Mod: GW,HPC,, | Performed by: INTERNAL MEDICINE

## 2017-10-17 PROCEDURE — 25000003 PHARM REV CODE 250: Performed by: HOSPITALIST

## 2017-10-17 PROCEDURE — 84100 ASSAY OF PHOSPHORUS: CPT

## 2017-10-17 PROCEDURE — 36430 TRANSFUSION BLD/BLD COMPNT: CPT

## 2017-10-17 PROCEDURE — 80053 COMPREHEN METABOLIC PANEL: CPT

## 2017-10-17 PROCEDURE — 83735 ASSAY OF MAGNESIUM: CPT

## 2017-10-17 PROCEDURE — 97116 GAIT TRAINING THERAPY: CPT

## 2017-10-17 PROCEDURE — P9016 RBC LEUKOCYTES REDUCED: HCPCS

## 2017-10-17 PROCEDURE — 93005 ELECTROCARDIOGRAM TRACING: CPT

## 2017-10-17 PROCEDURE — 25000003 PHARM REV CODE 250: Performed by: INTERNAL MEDICINE

## 2017-10-17 PROCEDURE — 63600175 PHARM REV CODE 636 W HCPCS: Performed by: INTERNAL MEDICINE

## 2017-10-17 PROCEDURE — 94761 N-INVAS EAR/PLS OXIMETRY MLT: CPT

## 2017-10-17 RX ORDER — LEVOTHYROXINE SODIUM 175 UG/1
175 TABLET ORAL DAILY
Qty: 30 TABLET | Refills: 0 | Status: SHIPPED | OUTPATIENT
Start: 2017-10-18 | End: 2018-10-18

## 2017-10-17 RX ORDER — HYDROCODONE BITARTRATE AND ACETAMINOPHEN 500; 5 MG/1; MG/1
TABLET ORAL
Status: DISCONTINUED | OUTPATIENT
Start: 2017-10-17 | End: 2017-10-17 | Stop reason: HOSPADM

## 2017-10-17 RX ADMIN — STANDARDIZED SENNA CONCENTRATE AND DOCUSATE SODIUM 1 TABLET: 8.6; 5 TABLET, FILM COATED ORAL at 08:10

## 2017-10-17 RX ADMIN — FERROUS SULFATE TAB EC 325 MG (65 MG FE EQUIVALENT) 325 MG: 325 (65 FE) TABLET DELAYED RESPONSE at 08:10

## 2017-10-17 RX ADMIN — CITALOPRAM HYDROBROMIDE 10 MG: 10 TABLET ORAL at 08:10

## 2017-10-17 RX ADMIN — METOPROLOL TARTRATE 50 MG: 50 TABLET ORAL at 08:10

## 2017-10-17 RX ADMIN — LEVOTHYROXINE SODIUM 175 MCG: 150 TABLET ORAL at 08:10

## 2017-10-17 RX ADMIN — PANTOPRAZOLE SODIUM 40 MG: 40 TABLET, DELAYED RELEASE ORAL at 08:10

## 2017-10-17 RX ADMIN — CEFTRIAXONE 1 G: 1 INJECTION, SOLUTION INTRAVENOUS at 10:10

## 2017-10-17 NOTE — NURSING
Spoke with daughter and informed her that pt should be ready for discharge by 1700, so that she can transport pt back to [facility] facility.

## 2017-10-17 NOTE — PLAN OF CARE
10/17/17 1407   Discharge Reassessment   Assessment Type Discharge Planning Reassessment     Dr Alford spoke with the pt's daughter regarding the discharge planned for the afternoon when the pt's blood transfusion is completed; she verbalizes understanding and agreement.....ABRAN Donohue CM

## 2017-10-17 NOTE — PROGRESS NOTES
I spoke to the pts daughter Mayra Kingston at [phone], she is going to bring the pt back to [place] and would like to speak to the pts nurse about what time she can pick her up since the pt was receiving blood. She has also spoken to Thania with  and will complete paperwork with them once they get to [place] this evening. I updated the pts nurse, Jenn and asked her to call the pts daughter with arrangements. Margaux Sherwood LMSW

## 2017-10-17 NOTE — PROGRESS NOTES
I sent discharge orders and AVS to [facility] via [facility]. Margaux Sherwood LMSW

## 2017-10-17 NOTE — PLAN OF CARE
Problem: Physical Therapy Goal  Goal: Physical Therapy Goal  Goals to be met by: 10-     Patient will increase functional independence with mobility by performin. Sit to stand transfer with Contact Guard Assistance  2. Bed to chair transfer with Contact Guard Assistance using Rolling Walker  3. Gait  x 300 feet with Contact Guard Assistance using Rolling Walker.   4. Lower extremity exercise program x20 reps per handout, with assistance as needed     Outcome: Ongoing (interventions implemented as appropriate)  PT for gait training, OOB to chair and therex

## 2017-10-17 NOTE — PLAN OF CARE
10/17/17 0731   Patient Assessment/Suction   Level of Consciousness (AVPU) alert   Respiratory Effort Normal;Unlabored   PRE-TX-O2-ETCO2   O2 Device (Oxygen Therapy) room air   SpO2 99 %   Pulse Oximetry Type Intermittent   $ Pulse Oximetry - Multiple Charge Pulse Oximetry - Multiple   Pulse (!) 55   Resp 18       Patient resting in bed with no respiratory distress noted.

## 2017-10-17 NOTE — PROGRESS NOTES
I spoke to Thania with [facility] at [phone] and she stated that the pts nurse will be up here shortly to meet with the family and have the pts readmitted. Margaux Sherwood LMSW

## 2017-10-17 NOTE — PROGRESS NOTES
I spoke to Fany at [facility] [phone] and she stated that the pt is ok to return and they would like discharge orders faxed to [phone]. Orders faxed. Margaux Sherwood LMSW

## 2017-10-17 NOTE — PLAN OF CARE
Problem: Patient Care Overview  Goal: Plan of Care Review  Outcome: Ongoing (interventions implemented as appropriate)  Patient AAO to self.  VSS.  Has remained afebrile this shift.  POC reviewed with patient.  Open discussion was facilitated.  Patient and daughter verbalized understanding.  Bed in lowest position, side rails up x2, call light within reach, and safety measures maintained throughout shift.  [place] in use.  Patient has remained free of falls, trauma, and injury this shift.  Patient denies any needs at this time.  Will continue to monitor.

## 2017-10-17 NOTE — PLAN OF CARE
Problem: Patient Care Overview  Goal: Plan of Care Review  Outcome: Ongoing (interventions implemented as appropriate)  Fall and safety precautions maintained. Pt up in bedside chair for most of the day so far. 1 unit of PRBC infusing at this time. Pt has orders to discharge after unit of blood is finished. Avasys still in use due to pts confusion and impulsivity. VS stable, see flowsheets, side rails up x 2, call light in reach. Will continue to monitor.

## 2017-10-17 NOTE — PROGRESS NOTES
10/16/17 1930   Patient Assessment/Suction   Level of Consciousness (AVPU) alert   PRE-TX-O2-ETCO2   O2 Device (Oxygen Therapy) room air   SpO2 100 %   Pulse Oximetry Type Intermittent   $ Pulse Oximetry - Multiple Charge Pulse Oximetry - Multiple   Pulse 62   Resp 16

## 2017-10-17 NOTE — PROGRESS NOTES
Progress Note  Cardiology    Admit Date: 10/15/2017   LOS: 2 days     Follow-up For:  AF / RVR    Scheduled Meds:   cefTRIAXone (ROCEPHIN) IVPB  1 g Intravenous Q24H    citalopram  10 mg Oral Daily    ferrous sulfate  325 mg Oral Daily with breakfast    levothyroxine  175 mcg Oral Daily    metoprolol tartrate  50 mg Oral BID    pantoprazole  40 mg Oral Daily    senna-docusate 8.6-50 mg  1 tablet Oral BID    simvastatin  20 mg Oral QHS     Continuous Infusions:   PRN Meds:sodium chloride, acetaminophen, dextrose 50%, dextrose 50%, glucagon (human recombinant), glucose, glucose, haloperidol, insulin aspart, magnesium oxide, magnesium oxide, ondansetron, potassium chloride 10%, potassium chloride 10%, ramelteon    Review of patient's allergies indicates:   Allergen Reactions    Devante-1     Codeine     Novocain [procaine]        SUBJECTIVE:     Interval History: Patient has no complaint of sob or palpitation.    Review of Systems  Respiratory: negative for cough, hemoptysis, sputum and wheezing  Cardiovascular: negative for chest pressure/discomfort, dyspnea and palpitations    OBJECTIVE:     Vital Signs (Most Recent)  Temp: 97.8 °F (36.6 °C) (10/17/17 1140)  Pulse: (!) 55 (10/17/17 1140)  Resp: 18 (10/17/17 1140)  BP: (!) 149/65 (10/17/17 1140)  SpO2: 99 % (10/17/17 1140)    Vital Signs Range (Last 24H):  Temp:  [97.4 °F (36.3 °C)-98.4 °F (36.9 °C)]   Pulse:  [54-62]   Resp:  [16-18]   BP: (121-157)/(57-72)   SpO2:  [99 %-100 %]       Physical Exam:  Neck: no carotid bruit, no JVD and supple, symmetrical, trachea midline  Lungs: clear to auscultation bilaterally, normal respiratory effort  Heart: regular rate and rhythm and systolic murmur: systolic ejection 1/6, medium pitch at 2nd left intercostal space, at 2nd right intercostal space  Extremities: Extremities normal, atraumatic, no cyanosis, clubbing, or edema    Recent Results (from the past 24 hour(s))   Urinalysis    Collection Time: 10/16/17  1:54 PM    Result Value Ref Range    Specimen UA Urine, Clean Catch     Color, UA Yellow Yellow, Straw, Gracy    Appearance, UA Cloudy (A) Clear    pH, UA 6.0 5.0 - 8.0    Specific Gravity, UA 1.025 1.005 - 1.030    Protein, UA 1+ (A) Negative    Glucose, UA Negative Negative    Ketones, UA Negative Negative    Bilirubin (UA) Negative Negative    Occult Blood UA 2+ (A) Negative    Nitrite, UA Positive (A) Negative    Urobilinogen, UA Negative <2.0 EU/dL    Leukocytes, UA 3+ (A) Negative   Urinalysis Microscopic    Collection Time: 10/16/17  1:54 PM   Result Value Ref Range    RBC, UA 10 (H) 0 - 4 /hpf    WBC, UA >100 (H) 0 - 5 /hpf    Bacteria, UA Many (A) None-Occ /hpf    Squam Epithel, UA 4 /hpf    Hyaline Casts, UA 0 0-1/lpf /lpf    Microscopic Comment SEE COMMENT    POCT glucose    Collection Time: 10/16/17  4:40 PM   Result Value Ref Range    POCT Glucose 166 (H) 70 - 110 mg/dL   POCT glucose    Collection Time: 10/16/17  8:21 PM   Result Value Ref Range    POCT Glucose 196 (H) 70 - 110 mg/dL   Comprehensive Metabolic Panel (CMP)    Collection Time: 10/17/17  4:48 AM   Result Value Ref Range    Sodium 136 136 - 145 mmol/L    Potassium 3.9 3.5 - 5.1 mmol/L    Chloride 105 95 - 110 mmol/L    CO2 22 (L) 23 - 29 mmol/L    Glucose 131 (H) 70 - 110 mg/dL    BUN, Bld 20 10 - 30 mg/dL    Creatinine 1.8 (H) 0.5 - 1.4 mg/dL    Calcium 9.1 8.7 - 10.5 mg/dL    Total Protein 6.1 6.0 - 8.4 g/dL    Albumin 2.9 (L) 3.5 - 5.2 g/dL    Total Bilirubin 0.4 0.1 - 1.0 mg/dL    Alkaline Phosphatase 40 (L) 55 - 135 U/L    AST 19 10 - 40 U/L    ALT 14 10 - 44 U/L    Anion Gap 9 8 - 16 mmol/L    eGFR if African American 28 (A) >60 mL/min/1.73 m^2    eGFR if non African American 24 (A) >60 mL/min/1.73 m^2   Magnesium    Collection Time: 10/17/17  4:48 AM   Result Value Ref Range    Magnesium 2.0 1.6 - 2.6 mg/dL   Phosphorus    Collection Time: 10/17/17  4:48 AM   Result Value Ref Range    Phosphorus 3.5 2.7 - 4.5 mg/dL   CBC with Automated  Differential    Collection Time: 10/17/17  4:48 AM   Result Value Ref Range    WBC 5.10 3.90 - 12.70 K/uL    RBC 2.68 (L) 4.00 - 5.40 M/uL    Hemoglobin 8.0 (L) 12.0 - 16.0 g/dL    Hematocrit 23.9 (L) 37.0 - 48.5 %    MCV 89 82 - 98 fL    MCH 29.7 27.0 - 31.0 pg    MCHC 33.3 32.0 - 36.0 g/dL    RDW 24.2 (H) 11.5 - 14.5 %    Platelets 174 150 - 350 K/uL    MPV 8.6 (L) 9.2 - 12.9 fL    Gran # 2.9 1.8 - 7.7 K/uL    Lymph # 1.6 1.0 - 4.8 K/uL    Mono # 0.4 0.3 - 1.0 K/uL    Eos # 0.2 0.0 - 0.5 K/uL    Baso # 0.00 0.00 - 0.20 K/uL    Gran% 57.5 38.0 - 73.0 %    Lymph% 30.8 18.0 - 48.0 %    Mono% 7.6 4.0 - 15.0 %    Eosinophil% 3.3 0.0 - 8.0 %    Basophil% 0.8 0.0 - 1.9 %    Differential Method Automated    POCT glucose    Collection Time: 10/17/17 10:59 AM   Result Value Ref Range    POCT Glucose 173 (H) 70 - 110 mg/dL       Diagnostic Results:  Labs: Reviewed  ECG: Reviewed    ASSESSMENT/PLAN:     No complaints. OOB in a chair. Had a good night. Discussed diagnosis with daughter last night. Medical mgmt.

## 2017-10-17 NOTE — DISCHARGE SUMMARY
Discharge Summary  Hospital Medicine    Admit Date: 10/15/2017    Date and Time: 10/17/89997:59 PM    Discharge Attending Physician: Irina Alford MD    Primary Care Physician: Hayes Jean MD    Diagnoses:  Active Hospital Problems    Diagnosis  POA    *Atrial fibrillation with tachycardic ventricular rate [I48.91]  Yes    Iron deficiency anemia due to chronic blood loss [D50.0]  Unknown    Adult myxedema [E03.9]  Unknown    Type 2 diabetes mellitus without complication [E11.9]  Yes    Depression [F32.9]  Yes    Essential hypertension [I10]  Yes    Nonrheumatic aortic valve stenosis [I35.0]  Yes      Resolved Hospital Problems    Diagnosis Date Resolved POA   No resolved problems to display.     Discharged Condition: Good    Hospital Course:   Patient is a 89-year-old female with a past medical history of hypertension and lipidemia hypothyroidism diabetes reflux dementia and depression who presents to the emergency department from a nursing home after she slid over the out of a chair while bending over.  She has a mild skin tear to the right elbow.  The patient was noted to be tachycardic and pale.  Currently she had no headache neck pain chest pain abdominal pain back pain or any complaints at all.  She has no history of atrial fibrillation or atrial flutter or SVT. Patient was found to be anemic in ED and was transfused 1 unit RBCs. She was started on cardizem gtt in ED and converted to NSR after transfusion. Patient remains asymptomatic. Patient was admitted to Hospitalist medicine service. Patient was evaluated by Dr. AIDAN Dawn. Patient's AFib converted to NSR with use of IV Cardizem. Patient received PRBC for anemia. Patient is presently at home with hospice. Patient is deemed too frail to under go cardiac or GI evaluation for anemia. Patient's daughter was in agreement. Patient was discharged to home with hospice in stable condition with following discharge plan of care. Total time with the patient  was 30 minutes and greater than 50% was spent in counseling and coordination of care. The assessment and plan have been discussed at length. Physicians' notes reviewed. Labs and procedure reviewed.     Consults: Dr. AIDAN Dawn    Significant Diagnostic Studies:   CXR: Atherosclerosis otherwise negative portable chest     ECHO:     1 - Left atrial enlargement.     2 - Concentric remodeling.     3 - No wall motion abnormalities.     4 - Normal left ventricular systolic function (EF 50-55%).     5 - The estimated PA systolic pressure is 21 mmHg.     6 - Moderate aortic stenosis, peak velocity = 3.61 m/s, mean gradient = 34 mmHg.     7 - Trivial aortic regurgitation.     8 - Mild mitral regurgitation.     9 - Trivial to mild tricuspid regurgitation.     Microbiology Results (last 7 days)     Procedure Component Value Units Date/Time    Urine culture [[id number]]     Order Status:  Canceled Specimen:  Urine     Urine Culture [[id number]] Collected:  10/16/17 1354    Order Status:  Sent Specimen:  Urine from Urine, Clean Catch Updated:  10/16/17 2011        Special Treatments/Procedures: None  Disposition: Home with hospice    Medications:  Reconciled Home Medications: Current Discharge Medication List      CONTINUE these medications which have CHANGED    Details   levothyroxine (SYNTHROID, LEVOTHROID) 175 MCG tablet Take 1 tablet (175 mcg total) by mouth once daily.  Qty: 30 tablet, Refills: 0         CONTINUE these medications which have NOT CHANGED    Details   aspirin 81 MG Chew Take 81 mg by mouth once daily.      b complex vitamins tablet Take 1 tablet by mouth once daily.      ergocalciferol (VITAMIN D2) 50,000 unit Cap Take 1,000 Units by mouth once daily.      hydrocortisone-pramoxine (PROCTOFOAM-HS) rectal foam Place 1 applicator rectally 4 (four) times daily.      naftifine (NAFTIN) 1 % cream Apply topically every evening. Apply to feet      oxycodone (ROXICODONE) 5 MG immediate release tablet Take 5 mg by mouth  every 4 (four) hours as needed for Pain.      citalopram (CELEXA) 10 MG tablet Take 10 mg by mouth once daily.      ferrous sulfate 325 mg (65 mg iron) Tab tablet Take 325 mg by mouth daily with breakfast.      furosemide (LASIX) 20 MG tablet Take 20 mg by mouth every other day.      hydrALAZINE (APRESOLINE) 25 MG tablet Take 25 mg by mouth 3 (three) times daily.      hydrocodone-acetaminophen 5-325mg (NORCO) 5-325 mg per tablet Take 1 tablet by mouth every 6 (six) hours as needed for Pain.      insulin glargine (LANTUS) 100 unit/mL injection Inject 25 Units into the skin once daily.       losartan (COZAAR) 100 MG tablet Take 100 mg by mouth once daily.      metoprolol tartrate (LOPRESSOR) 50 MG tablet Take 50 mg by mouth 2 (two) times daily.      nitrofurantoin, macrocrystal-monohydrate, (MACROBID) 100 MG capsule Take 100 mg by mouth 2 (two) times daily.      omeprazole (PRILOSEC) 40 MG capsule Take 40 mg by mouth once daily.      ondansetron (ZOFRAN) 4 MG tablet Take 4 mg by mouth every 8 (eight) hours as needed for Nausea.      simvastatin (ZOCOR) 20 MG tablet Take 20 mg by mouth every evening.             Discharge Procedure Orders  Diet general   Order Comments: Cardiac/ 2 gram sodium low cholesterol diet     Diet Diabetic 1800 Calories     Other restrictions (specify):   Order Comments: Fall precautions     Call MD for:   Order Comments: For worsening symptoms, chest pain, shortness of breath, increased abdominal pain, high grade fever, stroke or stroke like symptoms, immediately go to the nearest Emergency Room or call 911 as soon as possible.       Follow-up Information     [facility].    Specialty:  Hospice Services  Why:  Hospice  Contact information:  [address] [address] [address]  [phone]             Hayes Jean MD In 1 week.    Specialty:  Internal Medicine  Contact information:  [address] [facility] [address]  [facility] [address]  [phone]

## 2017-10-17 NOTE — PT/OT/SLP PROGRESS
Physical Therapy  Treatment    Lulú Eugene   MRN: [id number]   Admitting Diagnosis: Atrial fibrillation with tachycardic ventricular rate    PT Received On: 10/17/17  PT Start Time: 0922     PT Stop Time: 0944    PT Total Time (min): 22 min       Billable Minutes:  Gait [id number] and Therapeutic Exercise 8    Treatment Type: Treatment  PT/PTA: PTA     PTA Visit Number: 1       General Precautions: Standard, fall  Orthopedic Precautions: N/A   Braces: N/A         Subjective:  Communicated with nurse Barajas prior to session.  Pt agreeable to session.     Pain/Comfort  Pain Rating 1: 0/10    Objective:   Patient found with: telemetry ([employer])    Functional Mobility:  Bed Mobility:   Scooting/Bridging: Stand by Assistance (to EOB)  Supine to Sit: Contact Guard Assistance, With side rail    Transfers:  Sit <> Stand Assistance: Contact Guard Assistance  Sit <> Stand Assistive Device: Rolling Walker  Bed <> Chair Technique: Stand Pivot  Bed <> Chair Assistance: Contact Guard Assistance (cueing to tech and safety)  Bed <> Chair Assistive Device:  (HHA)    Gait:   Gait Distance: 240'  Assistance 1: Contact Guard Assistance  Gait Assistive Device: Rolling walker  Gait Pattern: swing-through gait  Gait Deviation(s): decreased ela, increased time in double stance, decreased velocity of limb motion, decreased step length, decreased stride length      Balance:   Static Sit: GOOD: Takes MODERATE challenges from all directions  Dynamic Sit: GOOD: Maintains balance through MODERATE excursions of active trunk movement  Static Stand: FAIR: Maintains without assist but unable to take challenges  Dynamic stand: FAIR: Needs CONTACT GUARD during gait     Therapeutic Activities and Exercises:  Pt assisted to chair following gait.    Seated BLE therex: AP, LAQ, marching, hip abd/add x 10-12 reps each       Patient left up in chair with all lines intact, call button in reach and nursing notified.    Assessment:  Lulú Eugene is a  89 y.o. female with a medical diagnosis of Atrial fibrillation with tachycardic ventricular rate and presents with good tolerance to gait and therex.    Rehab identified problem list/impairments: Rehab identified problem list/impairments: weakness, impaired endurance, impaired self care skills, impaired functional mobilty, gait instability, impaired balance, impaired cognition, decreased lower extremity function, decreased safety awareness    Rehab potential is good.    Activity tolerance: Good    Discharge recommendations: Discharge Facility/Level Of Care Needs: home health PT       GOALS:    Physical Therapy Goals        Problem: Physical Therapy Goal    Goal Priority Disciplines Outcome Goal Variances Interventions   Physical Therapy Goal    High PT/OT, PT      Description:  Goals to be met by: 10-     Patient will increase functional independence with mobility by performin. Sit to stand transfer with Contact Guard Assistance  2. Bed to chair transfer with Contact Guard Assistance using Rolling Walker  3. Gait  x 300 feet with Contact Guard Assistance using Rolling Walker.   4. Lower extremity exercise program x20 reps per handout, with assistance as needed                      PLAN:    Patient to be seen 6 x/week  to address the above listed problems via gait training, therapeutic activities, therapeutic exercises  Plan of Care expires: 10/27/17  Plan of Care reviewed with: patient         Reta Rodriguez, PTA  10/17/2017

## 2017-10-17 NOTE — PROGRESS NOTES
I sent a 3 day packet to [facility] via [facility] to see about readmitting the pt for hospice services. Margaux Sherwood LMSW

## 2017-10-18 LAB — BACTERIA UR CULT: NORMAL

## 2017-10-18 NOTE — PLAN OF CARE
10/18/17 1635   Final Note   Assessment Type Final Discharge Note   Discharge Disposition [facility]ospiceHome

## 2017-11-06 NOTE — PHYSICIAN QUERY
PT Name: Lulú Eugene  MR #: [id number]  Physician Query Form - CKD Clarification     Karie Crowell RN CDS    Contact Information: [phone]  This form is a permanent document in the medical record.     Query Date: November 6, 2017    By submitting this query, we are merely seeking further clarification of documentation. Please utilize your independent clinical judgment when addressing the question(s) below.    The Medical record contains the following:     Indicators   Supporting Clinical Findings   Location in Medical Record   X CKD or Chronic Kidney (Renal) Failure / Disease Anemia could be secondary to chronic kidney disease   Cardiology consult 10/15   X BUN/Creatinine                          GFR BUN   24,      CR  2.1       GFR 20  BUN   21,      CR 1.9        GFR 23  BUN   20,      CR 1.8        GFR 24 Lab 10/15  Lab 10/16  Lab 10/17    Dehydration      Nausea / Vomiting      Dialysis / CRRT      Medication      Treatment     X Other Chronic Conditions Chronic Renal Failure, Iron deficiency anemia due to chronic blood loss   Atrial fibrillation with RVR, HTN, Pulmonary HTN, Diabetes Mellitus   H&P 10/15    Other       Provider, please further specify the stage of CKD.      [  ] Chronic Kidney Disease (CKD) (please specify stage* below)       [employer] Definitions  Stage Description  eGFR (mL/min)   [ x ]     IV Severely reduced kidney function 15-29     [  ] Other (please specify): ________________________  [  ] Clinically Undetermined    Please document in your progress notes daily for the duration of treatment until resolved and include in your discharge summary.

## 2017-12-08 NOTE — HPI
Patient is a 89-year-old female with a past medical history of hypertension and lipidemia hypothyroidism diabetes reflux dementia and depression who presents to the emergency department from a nursing home after she slid over the out of a chair while bending over.  She has a mild skin tear to the right elbow.  The patient was noted to be tachycardic and pale.  Currently she has no complete a headache neck pain chest pain abdominal pain back pain or any complaints at all.  She has no history of atrial fibrillation or atrial flutter or SVT. Patient was found to be anemic in ED and was transfused 1 unit RBCs. She was started on cardizem gtt in ED and converted to NSR after transfusion. Patient remains asymptomatic.   <<-----Click on this checkbox to enter Post-Op Dx

## 2018-02-16 ENCOUNTER — HOSPITAL ENCOUNTER (INPATIENT)
Facility: HOSPITAL | Age: 83
LOS: 4 days | Discharge: HOSPICE | DRG: 812 | End: 2018-02-20
Attending: EMERGENCY MEDICINE | Admitting: INTERNAL MEDICINE
Payer: MEDICARE

## 2018-02-16 DIAGNOSIS — I10 ESSENTIAL HYPERTENSION: ICD-10-CM

## 2018-02-16 DIAGNOSIS — N39.0 URINARY TRACT INFECTION WITHOUT HEMATURIA, SITE UNSPECIFIED: ICD-10-CM

## 2018-02-16 DIAGNOSIS — E11.9 TYPE 2 DIABETES MELLITUS WITHOUT COMPLICATION, UNSPECIFIED LONG TERM INSULIN USE STATUS: ICD-10-CM

## 2018-02-16 DIAGNOSIS — R55 SYNCOPE: ICD-10-CM

## 2018-02-16 DIAGNOSIS — E03.9 HYPOTHYROIDISM, UNSPECIFIED TYPE: ICD-10-CM

## 2018-02-16 DIAGNOSIS — R55 SYNCOPE, UNSPECIFIED SYNCOPE TYPE: ICD-10-CM

## 2018-02-16 DIAGNOSIS — D64.9 ANEMIA, UNSPECIFIED TYPE: Primary | ICD-10-CM

## 2018-02-16 LAB
ABO + RH BLD: NORMAL
ALBUMIN SERPL BCP-MCNC: 2.8 G/DL
ALP SERPL-CCNC: 43 U/L
ALT SERPL W/O P-5'-P-CCNC: 7 U/L
ANION GAP SERPL CALC-SCNC: 7 MMOL/L
AST SERPL-CCNC: 12 U/L
BACTERIA #/AREA URNS HPF: ABNORMAL /HPF
BASOPHILS # BLD AUTO: 0 K/UL
BASOPHILS NFR BLD: 0.3 %
BILIRUB SERPL-MCNC: 0.3 MG/DL
BILIRUB UR QL STRIP: NEGATIVE
BLD GP AB SCN CELLS X3 SERPL QL: NORMAL
BUN SERPL-MCNC: 32 MG/DL
CALCIUM SERPL-MCNC: 8.5 MG/DL
CHLORIDE SERPL-SCNC: 102 MMOL/L
CLARITY UR: ABNORMAL
CO2 SERPL-SCNC: 26 MMOL/L
COLOR UR: YELLOW
CREAT SERPL-MCNC: 1.3 MG/DL
DIFFERENTIAL METHOD: ABNORMAL
EOSINOPHIL # BLD AUTO: 0.1 K/UL
EOSINOPHIL NFR BLD: 0.8 %
ERYTHROCYTE [DISTWIDTH] IN BLOOD BY AUTOMATED COUNT: 19.9 %
EST. GFR  (AFRICAN AMERICAN): 41 ML/MIN/1.73 M^2
EST. GFR  (NON AFRICAN AMERICAN): 35 ML/MIN/1.73 M^2
FOLATE SERPL-MCNC: 16.7 NG/ML
GLUCOSE SERPL-MCNC: 171 MG/DL
GLUCOSE UR QL STRIP: ABNORMAL
HCT VFR BLD AUTO: 16.5 %
HGB BLD-MCNC: 5.2 G/DL
HGB UR QL STRIP: NEGATIVE
IRON SERPL-MCNC: 44 UG/DL
KETONES UR QL STRIP: NEGATIVE
LEUKOCYTE ESTERASE UR QL STRIP: ABNORMAL
LYMPHOCYTES # BLD AUTO: 2.4 K/UL
LYMPHOCYTES NFR BLD: 26.9 %
MCH RBC QN AUTO: 29.1 PG
MCHC RBC AUTO-ENTMCNC: 31.8 G/DL
MCV RBC AUTO: 92 FL
MICROSCOPIC COMMENT: ABNORMAL
MONOCYTES # BLD AUTO: 0.7 K/UL
MONOCYTES NFR BLD: 7.4 %
NEUTROPHILS # BLD AUTO: 5.8 K/UL
NEUTROPHILS NFR BLD: 64.6 %
NITRITE UR QL STRIP: POSITIVE
PH UR STRIP: 6 [PH] (ref 5–8)
PLATELET # BLD AUTO: 206 K/UL
PMV BLD AUTO: 9.4 FL
POTASSIUM SERPL-SCNC: 3.9 MMOL/L
PROT SERPL-MCNC: 6 G/DL
PROT UR QL STRIP: NEGATIVE
RBC # BLD AUTO: 1.8 M/UL
SATURATED IRON: 17 %
SODIUM SERPL-SCNC: 135 MMOL/L
SP GR UR STRIP: 1.02 (ref 1–1.03)
T4 FREE SERPL-MCNC: 0.47 NG/DL
TOTAL IRON BINDING CAPACITY: 255 UG/DL
TRANSFERRIN SERPL-MCNC: 172 MG/DL
TSH SERPL DL<=0.005 MIU/L-ACNC: 91.98 UIU/ML
URN SPEC COLLECT METH UR: ABNORMAL
UROBILINOGEN UR STRIP-ACNC: NEGATIVE EU/DL
VIT B12 SERPL-MCNC: 990 PG/ML
WBC # BLD AUTO: 9 K/UL
WBC #/AREA URNS HPF: 50 /HPF (ref 0–5)
YEAST URNS QL MICRO: ABNORMAL

## 2018-02-16 PROCEDURE — 25000003 PHARM REV CODE 250: Performed by: INTERNAL MEDICINE

## 2018-02-16 PROCEDURE — 83540 ASSAY OF IRON: CPT

## 2018-02-16 PROCEDURE — 36430 TRANSFUSION BLD/BLD COMPNT: CPT

## 2018-02-16 PROCEDURE — 12000002 HC ACUTE/MED SURGE SEMI-PRIVATE ROOM

## 2018-02-16 PROCEDURE — 87088 URINE BACTERIA CULTURE: CPT

## 2018-02-16 PROCEDURE — 84439 ASSAY OF FREE THYROXINE: CPT

## 2018-02-16 PROCEDURE — 82607 VITAMIN B-12: CPT

## 2018-02-16 PROCEDURE — 87086 URINE CULTURE/COLONY COUNT: CPT

## 2018-02-16 PROCEDURE — P9016 RBC LEUKOCYTES REDUCED: HCPCS

## 2018-02-16 PROCEDURE — 82746 ASSAY OF FOLIC ACID SERUM: CPT

## 2018-02-16 PROCEDURE — 99223 1ST HOSP IP/OBS HIGH 75: CPT | Mod: AI,GW,HPC, | Performed by: INTERNAL MEDICINE

## 2018-02-16 PROCEDURE — 93005 ELECTROCARDIOGRAM TRACING: CPT

## 2018-02-16 PROCEDURE — 86920 COMPATIBILITY TEST SPIN: CPT

## 2018-02-16 PROCEDURE — 81000 URINALYSIS NONAUTO W/SCOPE: CPT

## 2018-02-16 PROCEDURE — 85025 COMPLETE CBC W/AUTO DIFF WBC: CPT

## 2018-02-16 PROCEDURE — 80053 COMPREHEN METABOLIC PANEL: CPT

## 2018-02-16 PROCEDURE — 63600175 PHARM REV CODE 636 W HCPCS: Performed by: EMERGENCY MEDICINE

## 2018-02-16 PROCEDURE — 84443 ASSAY THYROID STIM HORMONE: CPT

## 2018-02-16 PROCEDURE — 87186 SC STD MICRODIL/AGAR DIL: CPT

## 2018-02-16 PROCEDURE — 87077 CULTURE AEROBIC IDENTIFY: CPT

## 2018-02-16 PROCEDURE — 96374 THER/PROPH/DIAG INJ IV PUSH: CPT

## 2018-02-16 PROCEDURE — P9612 CATHETERIZE FOR URINE SPEC: HCPCS

## 2018-02-16 PROCEDURE — 86850 RBC ANTIBODY SCREEN: CPT

## 2018-02-16 PROCEDURE — 36415 COLL VENOUS BLD VENIPUNCTURE: CPT

## 2018-02-16 PROCEDURE — 99291 CRITICAL CARE FIRST HOUR: CPT | Mod: 25

## 2018-02-16 PROCEDURE — 93010 ELECTROCARDIOGRAM REPORT: CPT | Mod: ,,, | Performed by: INTERNAL MEDICINE

## 2018-02-16 RX ORDER — PANTOPRAZOLE SODIUM 40 MG/1
40 TABLET, DELAYED RELEASE ORAL DAILY
Status: DISCONTINUED | OUTPATIENT
Start: 2018-02-17 | End: 2018-02-20 | Stop reason: HOSPADM

## 2018-02-16 RX ORDER — CEFTRIAXONE 1 G/1
1 INJECTION, POWDER, FOR SOLUTION INTRAMUSCULAR; INTRAVENOUS
Status: COMPLETED | OUTPATIENT
Start: 2018-02-16 | End: 2018-02-16

## 2018-02-16 RX ORDER — HYDROCODONE BITARTRATE AND ACETAMINOPHEN 5; 325 MG/1; MG/1
1 TABLET ORAL EVERY 6 HOURS PRN
Status: DISCONTINUED | OUTPATIENT
Start: 2018-02-16 | End: 2018-02-20 | Stop reason: HOSPADM

## 2018-02-16 RX ORDER — AMOXICILLIN 250 MG
1 CAPSULE ORAL DAILY PRN
Status: DISCONTINUED | OUTPATIENT
Start: 2018-02-16 | End: 2018-02-20 | Stop reason: HOSPADM

## 2018-02-16 RX ORDER — ACETAMINOPHEN 325 MG/1
650 TABLET ORAL EVERY 6 HOURS PRN
Status: DISCONTINUED | OUTPATIENT
Start: 2018-02-16 | End: 2018-02-20 | Stop reason: HOSPADM

## 2018-02-16 RX ORDER — ONDANSETRON 2 MG/ML
4 INJECTION INTRAMUSCULAR; INTRAVENOUS EVERY 8 HOURS PRN
Status: DISCONTINUED | OUTPATIENT
Start: 2018-02-16 | End: 2018-02-20 | Stop reason: HOSPADM

## 2018-02-16 RX ORDER — CHOLECALCIFEROL (VITAMIN D3) 25 MCG
1000 TABLET ORAL DAILY
COMMUNITY

## 2018-02-16 RX ORDER — NAPROXEN SODIUM 220 MG/1
81 TABLET, FILM COATED ORAL DAILY
Status: DISCONTINUED | OUTPATIENT
Start: 2018-02-17 | End: 2018-02-20 | Stop reason: HOSPADM

## 2018-02-16 RX ORDER — FERROUS SULFATE 325(65) MG
325 TABLET ORAL
Status: DISCONTINUED | OUTPATIENT
Start: 2018-02-17 | End: 2018-02-17 | Stop reason: SDUPTHER

## 2018-02-16 RX ORDER — CEFTRIAXONE 1 G/1
1 INJECTION, POWDER, FOR SOLUTION INTRAMUSCULAR; INTRAVENOUS
Status: DISCONTINUED | OUTPATIENT
Start: 2018-02-17 | End: 2018-02-20 | Stop reason: HOSPADM

## 2018-02-16 RX ORDER — FUROSEMIDE 10 MG/ML
20 INJECTION INTRAMUSCULAR; INTRAVENOUS
Status: DISCONTINUED | OUTPATIENT
Start: 2018-02-16 | End: 2018-02-20 | Stop reason: HOSPADM

## 2018-02-16 RX ORDER — LOSARTAN POTASSIUM 25 MG/1
100 TABLET ORAL DAILY
Status: DISCONTINUED | OUTPATIENT
Start: 2018-02-17 | End: 2018-02-20 | Stop reason: HOSPADM

## 2018-02-16 RX ORDER — METOPROLOL TARTRATE 50 MG/1
50 TABLET ORAL 2 TIMES DAILY
Status: DISCONTINUED | OUTPATIENT
Start: 2018-02-16 | End: 2018-02-20 | Stop reason: HOSPADM

## 2018-02-16 RX ORDER — HYDRALAZINE HYDROCHLORIDE 25 MG/1
25 TABLET, FILM COATED ORAL 3 TIMES DAILY
Status: DISCONTINUED | OUTPATIENT
Start: 2018-02-16 | End: 2018-02-20 | Stop reason: HOSPADM

## 2018-02-16 RX ORDER — CHOLECALCIFEROL (VITAMIN D3) 25 MCG
1000 TABLET ORAL DAILY
Status: DISCONTINUED | OUTPATIENT
Start: 2018-02-17 | End: 2018-02-20 | Stop reason: HOSPADM

## 2018-02-16 RX ORDER — CITALOPRAM 10 MG/1
10 TABLET ORAL DAILY
Status: DISCONTINUED | OUTPATIENT
Start: 2018-02-17 | End: 2018-02-20 | Stop reason: HOSPADM

## 2018-02-16 RX ORDER — DOXYCYCLINE 100 MG/1
100 CAPSULE ORAL EVERY OTHER DAY
COMMUNITY

## 2018-02-16 RX ORDER — CEFTRIAXONE 1 G/1
1 INJECTION, POWDER, FOR SOLUTION INTRAMUSCULAR; INTRAVENOUS
Status: DISCONTINUED | OUTPATIENT
Start: 2018-02-16 | End: 2018-02-16

## 2018-02-16 RX ORDER — FUROSEMIDE 20 MG/1
20 TABLET ORAL EVERY OTHER DAY
Status: DISCONTINUED | OUTPATIENT
Start: 2018-02-17 | End: 2018-02-19

## 2018-02-16 RX ORDER — SIMVASTATIN 10 MG/1
20 TABLET, FILM COATED ORAL NIGHTLY
Status: DISCONTINUED | OUTPATIENT
Start: 2018-02-16 | End: 2018-02-20 | Stop reason: HOSPADM

## 2018-02-16 RX ADMIN — CEFTRIAXONE SODIUM 1 G: 1 INJECTION, POWDER, FOR SOLUTION INTRAMUSCULAR; INTRAVENOUS at 04:02

## 2018-02-16 RX ADMIN — METOPROLOL TARTRATE 50 MG: 50 TABLET ORAL at 09:02

## 2018-02-16 RX ADMIN — SIMVASTATIN 20 MG: 10 TABLET, FILM COATED ORAL at 09:02

## 2018-02-16 RX ADMIN — HYDRALAZINE HYDROCHLORIDE 25 MG: 25 TABLET, FILM COATED ORAL at 09:02

## 2018-02-16 NOTE — ED PROVIDER NOTES
"Encounter Date: 2/16/2018    SCRIBE #1 NOTE: I, Riya Roper, am scribing for, and in the presence of, Dr. Waters.       History     Chief Complaint   Patient presents with    Fatigue     General all over weakness       02/16/2018  2:07 PM    Chief Complaint: Fatigue      The patient is a 89 y.o. female with hx of dementia, depression, diabetes, GERD, HLD, HTN, A-fib, aortic stenosis, and thyroid disease who presents with of intermittent fatigue and weakness. Her daughter reports this began a few weeks ago. [facility] tried to transfer her from her bed today and she collapsed. Her daughter noted she was "purple" and placed her on O2. Pt had a similar episode 08/2018. She received a blood transfusion that seemed to alleviate her symptoms. SHx include cholecystectomy and appendectomy.all history provided by her daught      The history is provided by a relative (Daughter). The history is limited by the condition of the patient (Dementia).     Review of patient's allergies indicates:   Allergen Reactions    Devante-1     Codeine     Novocain [procaine]      Past Medical History:   Diagnosis Date    Dementia     Depression     Diabetes mellitus     GERD (gastroesophageal reflux disease)     Hyperlipidemia     Hypertension     Thyroid disease      Past Surgical History:   Procedure Laterality Date    APPENDECTOMY      CHOLECYSTECTOMY       Family History   Problem Relation Age of Onset    Heart disease Brother     Hypertension Brother     COPD Son     Heart disease Son      Social History   Substance Use Topics    Smoking status: Never Smoker    Smokeless tobacco: Never Used    Alcohol use No     Review of Systems   Unable to perform ROS: Dementia   Constitutional: Positive for fatigue.   Neurological: Positive for weakness.       Physical Exam     Vitals:    02/16/18 1458   BP:    Pulse: 83   Resp:    Temp:      Physical Exam    Nursing note and vitals reviewed.  Constitutional: She appears " well-developed and well-nourished. She is not diaphoretic. No distress.   HENT:   Head: Normocephalic and atraumatic.   Eyes: EOM are normal.   Neck: Normal range of motion. Neck supple.   Cardiovascular: Normal rate and regular rhythm. Exam reveals no gallop and no friction rub.    Murmur heard.   Systolic murmur is present   Pulmonary/Chest: Breath sounds normal. No respiratory distress. She has no wheezes. She has no rhonchi. She has no rales.   Musculoskeletal: Normal range of motion.   Neurological: She is alert.   Nonverbal. Opens eyes to voice. Knows her name. Does not follow commands.   Skin: Skin is warm and dry.   Psychiatric: Judgment normal.   Flat affect.         ED Course   Critical Care  Date/Time: 2/16/2018 5:33 PM  Performed by: MELISA GARCIA  Authorized by: MARTY MACIAS   Direct patient critical care time: 9 minutes  Additional history critical care time: 6 minutes  Ordering / reviewing critical care time: 6 minutes  Documentation critical care time: 7 minutes  Consulting other physicians critical care time: 5 minutes  Consult with family critical care time: 4 minutes  Total critical care time (exclusive of procedural time) : 37 minutes  Critical care was necessary to treat or prevent imminent or life-threatening deterioration of the following conditions: hgb 5.  Critical care was time spent personally by me on the following activities: review of old charts, re-evaluation of patient's condition, pulse oximetry, ordering and review of radiographic studies, ordering and review of laboratory studies, obtaining history from patient or surrogate and ordering and performing treatments and interventions.        Labs Reviewed   CBC W/ AUTO DIFFERENTIAL - Abnormal; Notable for the following:        Result Value    RBC 1.80 (*)     Hemoglobin 5.2 (*)     Hematocrit 16.5 (*)     MCHC 31.8 (*)     RDW 19.9 (*)     All other components within normal limits    Narrative:     Hgb and Hct   critical result(s)  called and verbal readback obtained   from Lin Parmar, 02/16/2018 15:06   COMPREHENSIVE METABOLIC PANEL - Abnormal; Notable for the following:     Sodium 135 (*)     Glucose 171 (*)     BUN, Bld 32 (*)     Calcium 8.5 (*)     Albumin 2.8 (*)     Alkaline Phosphatase 43 (*)     ALT 7 (*)     Anion Gap 7 (*)     eGFR if  41 (*)     eGFR if non  35 (*)     All other components within normal limits   URINALYSIS - Abnormal; Notable for the following:     Appearance, UA Hazy (*)     Glucose, UA 1+ (*)     Nitrite, UA Positive (*)     Leukocytes, UA 2+ (*)     All other components within normal limits   URINALYSIS MICROSCOPIC - Abnormal; Notable for the following:     WBC, UA 50 (*)     Bacteria, UA Many (*)     Yeast, UA Moderate (*)     All other components within normal limits   CULTURE, URINE   TSH   TYPE & SCREEN   PREPARE RBC SOFT         Imaging Results          X-Ray Chest AP Portable (Final result)  Result time 02/16/18 14:54:18    Final result by Ofe Schaffer MD (02/16/18 14:54:18)                 Impression:        No acute cardiopulmonary disease      Electronically signed by: OFE SCHAFFER MD  Date:     02/16/18  Time:    14:54              Narrative:    Comparison study: 10/15/2017  1 view chest  The cardiac mediastinal silhouette is stable.  lungs are clear of infiltrate.  There is no pleural effusion                               Medical Decision Making:   History:   Old Medical Records: I decided to obtain old medical records.  Clinical Tests:   Lab Tests: Ordered and Reviewed  Radiological Study: Ordered and Reviewed  Medical Tests: Ordered and Reviewed            Scribe Attestation:   Scribe #1: I performed the above scribed service and the documentation accurately describes the services I performed. I attest to the accuracy of the note.    I, Dr. Waters, personally performed the services described in this documentation. All medical record entries made by the  marina were at my direction and in my presence.  I have reviewed the chart and agree that the record reflects my personal performance and is accurate and complete.5:33 PM 02/16/2018            ED Course as of Feb 16 1546 Fri Feb 16, 2018   1516 Hemoglobin: (!!) 5.2 [EF]      ED Course User Index  [EF] Dusty Waters MD     Clinical Impression:     1. Anemia, unspecified type    2. Syncope    3. Urinary tract infection without hematuria, site unspecified        Disposition:   Disposition: Discharged  Condition: Stable     89-year-old female with dementia and aortic stenosis who is on hospice presents to the emergency room for multiple episodes of syncope.  Found to have a hemoglobin of 5.  Also has a bladder infection.  Daughter would like the patient admitted for blood transfusion, hospice will temporarily be revoked.  Patient would continue with her DO NOT RESUSCITATE status however.  No distress in the ER.                   Dusty Waters MD  02/16/18 8073

## 2018-02-16 NOTE — H&P
"PCP: Hayes Jean MD    History & Physical    Chief Complaint: Generalized fatigue    History of Present Illness:  Patient is a 89 y.o. female admitted to Hospitalist Service from [facility] Emergency Room with complaint of generalized fatigue. Patient reportedly has past medical history significant for hypertension, hyperlipidemia, dementia, Aortic stenosis. Part of the history obtained from daughter. Patient has been under care of [facility] at an assisted living facility. Daughter has revoked hospice. Patient has history of multiple transfusions in the past. Her daughter reports this began a few weeks ago. [facility] tried to transfer her from her bed today and she collapsed. Her daughter noted she was "purple" and placed her on O2. Pt had a similar episode 08/2017. She received a blood transfusion that seemed to alleviate her symptoms. No obvious blood loss or black stools, melena or hematuria reported. Patient denied chest pain, shortness of breath, abdominal pain, nausea, vomiting, headache, vision changes, focal neuro-deficits, cough or fever.    Past Medical History:   Diagnosis Date    Dementia     Depression     Diabetes mellitus     GERD (gastroesophageal reflux disease)     Hyperlipidemia     Hypertension     Thyroid disease      Past Surgical History:   Procedure Laterality Date    APPENDECTOMY      CHOLECYSTECTOMY       Family History   Problem Relation Age of Onset    Heart disease Brother     Hypertension Brother     COPD Son     Heart disease Son      Social History   Substance Use Topics    Smoking status: Never Smoker    Smokeless tobacco: Never Used    Alcohol use No      Review of patient's allergies indicates:   Allergen Reactions    Devante-1     Codeine     Novocain [procaine]        (Not in a hospital admission)  Review of Systems: Limited historian due to dementia  Constitutional: no fever or chills. + profound weakness  Eyes: no visual changes  Ears, " nose, mouth, throat, and face: no nasal congestion or sore throat  Respiratory: no cough or shorness of breath  Cardiovascular: no chest pain or palpitations  Gastrointestinal: no nausea or vomiting, no abdominal pain or change in bowel habits  Genitourinary: no hematuria or dysuria  Integument/breast: no rash or pruritis  Hematologic/lymphatic: no easy bruising or lymphadenopathy. + History of transfusions  Musculoskeletal: no arthralgias or myalgias  Neurological: no seizures or tremors.  Behavioral/Psych: no auditory or visual hallucinations  Endocrine: no heat or cold intolerance     OBJECTIVE:     Vital Signs (Most Recent)  Temp: 96.9 °F (36.1 °C) (02/16/18 1403)  Pulse: 94 (02/16/18 1628)  Resp: 16 (02/16/18 1403)  BP: (!) 116/55 (02/16/18 1617)  SpO2: 98 % (02/16/18 1628)    Physical Exam:  General appearance: well developed, appears stated age, elderly frail female  Head: normocephalic, atraumatic  Eyes:  Pallor conjunctivae/corneas clear. PERRL.  Nose: Nares normal. Septum midline.  Throat: lips, mucosa, and tongue normal; teeth and gums normal, no throat erythema.  Neck: supple, symmetrical, trachea midline, no JVD and thyroid not enlarged, symmetric, no tenderness/mass/nodules  Lungs:  clear to auscultation bilaterally and normal respiratory effort  Chest wall: no tenderness  Heart: regular rate and rhythm, S1, S2 normal, 4/5 KIMBERLI  Abdomen: soft, non-tender non-distented; bowel sounds normal; no masses,  no organomegaly  Extremities: no cyanosis, clubbing or edema.   Pulses: 2+ and symmetric  Skin: Skin color, texture, turgor normal. No rashes or lesions.  Lymph nodes: Cervical, supraclavicular, and axillary nodes normal.  Neurologic: Grossly non-focal. Nonverbal. Opens eyes to voice. Knows her name. Does not follow commands.     Laboratory:   CBC:   Recent Labs  Lab 02/16/18  1444   WBC 9.00   RBC 1.80*   HGB 5.2*   HCT 16.5*      MCV 92   MCH 29.1   MCHC 31.8*     CMP:   Recent Labs  Lab  02/16/18  1444   *   CALCIUM 8.5*   ALBUMIN 2.8*   PROT 6.0   *   K 3.9   CO2 26      BUN 32*   CREATININE 1.3   ALKPHOS 43*   ALT 7*   AST 12   BILITOT 0.3     Microbiology Results (last 7 days)     Procedure Component Value Units Date/Time    Urine culture [[id number]] Collected:  02/16/18 1603    Order Status:  Sent Specimen:  Urine from Urine, Catheterized Updated:  02/16/18 1604          Recent Labs  Lab 02/16/18  1516   COLORU Yellow   SPECGRAV 1.020   PHUR 6.0   PROTEINUA Negative   BACTERIA Many*   NITRITE Positive*   LEUKOCYTESUR 2+*   UROBILINOGEN Negative       Hemoglobin A1C   Date Value Ref Range Status   08/11/2017 7.0 (H) 4.0 - 5.6 % Final     Comment:     According to ADA guidelines, hemoglobin A1c <7.0% represents  optimal control in non-pregnant diabetic patients. Different  metrics may apply to specific patient populations.   Standards of Medical Care in Diabetes-2016.  For the purpose of screening for the presence of diabetes:  <5.7%     Consistent with the absence of diabetes  5.7-6.4%  Consistent with increasing risk for diabetes   (prediabetes)  >or=6.5%  Consistent with diabetes  Currently, no consensus exists for use of hemoglobin A1c  for diagnosis of diabetes for children.  This Hemoglobin A1c assay has significant interference with fetal   hemoglobin   (HbF). The results are invalid for patients with abnormal amounts of   HbF,   including those with known Hereditary Persistence   of Fetal Hemoglobin. Heterozygous hemoglobin variants (HbAS, HbAC,   HbAD, HbAE, HbA2) do not significantly interfere with this assay;   however, presence of multiple variants in a sample may impact the %   interference.       Microbiology Results (last 7 days)     Procedure Component Value Units Date/Time    Urine culture [[id number]] Collected:  02/16/18 1603    Order Status:  Sent Specimen:  Urine from Urine, Catheterized Updated:  02/16/18 1604        Diagnostic Results:  Chest X-Ray: No  acute cardiopulmonary disease    Assessment/Plan:     Severe Symptomatic anemia  Transfuse 3 units of PRBC. Follow BMP. Give IV lasix after 2nd and 3rd PRBC.  Check Iron, TIBC, B12, Folate and FOBT.    UTI  Urine C/S. Start IV Rocephin.    Debility - multifactorial (advancing age/dementia/profound hypothyroidism, severe AS/Severe anemia)  Supportive care.  Fall precautions.    Moderate Malnutrition  Nutrition consulted. Encourage maximal PO intake. Diet supplementation ordered per nutrition approval. Will encourage PO and monitor closely for weight changes.    Atrial fibrillation , controlled     Atrial Fibrillation uncontrolled currently with Beta Blocker and Calcium Channel Blocker. [id number] score 4. Anticoagulation not indicated currently due to patient's anemia.          Adult myxedema     Patient with severe hypothyroidism. Increase Levothyroxine 225 mcg po q day                    Depression     Chronic problem, controlled. Will continue chronic medication(s), and monitor for any changes, adjusting as needed.          Nonrheumatic aortic valve stenosis     Noted.        Type 2 diabetes mellitus without complication     Check blood glucose level q AC/HS.  Use Novolog Insulin Sliding Scale as needed.   Continue American Diabetic Association 1800 Kcal diet.         Essential hypertension     Chronic problem. Will continue chronic medications and monitor for any changes, adjusting as needed.     DVT prophylaxis: Use SCD and TEDs. Hold anticoagulation until FOBT is ruled out.    Code Status: DNR - confirmed from daughter.    Irina Alford MD  Department of Hospital Medicine   [facility]

## 2018-02-17 LAB
ALBUMIN SERPL BCP-MCNC: 2.9 G/DL
ALP SERPL-CCNC: 47 U/L
ALT SERPL W/O P-5'-P-CCNC: 8 U/L
ANION GAP SERPL CALC-SCNC: 11 MMOL/L
AST SERPL-CCNC: 14 U/L
BASOPHILS # BLD AUTO: 0.1 K/UL
BASOPHILS NFR BLD: 1 %
BILIRUB SERPL-MCNC: 0.7 MG/DL
BLD PROD TYP BPU: NORMAL
BLOOD UNIT EXPIRATION DATE: NORMAL
BLOOD UNIT TYPE CODE: 6200
BLOOD UNIT TYPE: NORMAL
BUN SERPL-MCNC: 33 MG/DL
CALCIUM SERPL-MCNC: 8.5 MG/DL
CHLORIDE SERPL-SCNC: 99 MMOL/L
CO2 SERPL-SCNC: 27 MMOL/L
CODING SYSTEM: NORMAL
CREAT SERPL-MCNC: 1.3 MG/DL
DIFFERENTIAL METHOD: ABNORMAL
DISPENSE STATUS: NORMAL
EOSINOPHIL # BLD AUTO: 0.2 K/UL
EOSINOPHIL NFR BLD: 3.5 %
ERYTHROCYTE [DISTWIDTH] IN BLOOD BY AUTOMATED COUNT: 19.2 %
EST. GFR  (AFRICAN AMERICAN): 41 ML/MIN/1.73 M^2
EST. GFR  (NON AFRICAN AMERICAN): 35 ML/MIN/1.73 M^2
GLUCOSE SERPL-MCNC: 157 MG/DL
HCT VFR BLD AUTO: 31.9 %
HGB BLD-MCNC: 10.6 G/DL
LYMPHOCYTES # BLD AUTO: 1.8 K/UL
LYMPHOCYTES NFR BLD: 26 %
MCH RBC QN AUTO: 28.7 PG
MCHC RBC AUTO-ENTMCNC: 33.3 G/DL
MCV RBC AUTO: 86 FL
MONOCYTES # BLD AUTO: 0.7 K/UL
MONOCYTES NFR BLD: 9.3 %
NEUTROPHILS # BLD AUTO: 4.3 K/UL
NEUTROPHILS NFR BLD: 60.2 %
NUM UNITS TRANS PACKED RBC: NORMAL
PLATELET # BLD AUTO: 163 K/UL
PMV BLD AUTO: 9.4 FL
POCT GLUCOSE: 386 MG/DL (ref 70–110)
POCT GLUCOSE: 392 MG/DL (ref 70–110)
POTASSIUM SERPL-SCNC: 4 MMOL/L
PROT SERPL-MCNC: 6.5 G/DL
RBC # BLD AUTO: 3.71 M/UL
SODIUM SERPL-SCNC: 137 MMOL/L
WBC # BLD AUTO: 7.1 K/UL

## 2018-02-17 PROCEDURE — 80053 COMPREHEN METABOLIC PANEL: CPT

## 2018-02-17 PROCEDURE — 36415 COLL VENOUS BLD VENIPUNCTURE: CPT

## 2018-02-17 PROCEDURE — P9016 RBC LEUKOCYTES REDUCED: HCPCS

## 2018-02-17 PROCEDURE — 63600175 PHARM REV CODE 636 W HCPCS: Performed by: INTERNAL MEDICINE

## 2018-02-17 PROCEDURE — 12000002 HC ACUTE/MED SURGE SEMI-PRIVATE ROOM

## 2018-02-17 PROCEDURE — 83036 HEMOGLOBIN GLYCOSYLATED A1C: CPT

## 2018-02-17 PROCEDURE — 85025 COMPLETE CBC W/AUTO DIFF WBC: CPT

## 2018-02-17 PROCEDURE — 25000003 PHARM REV CODE 250: Performed by: INTERNAL MEDICINE

## 2018-02-17 RX ORDER — FERROUS SULFATE 325(65) MG
325 TABLET, DELAYED RELEASE (ENTERIC COATED) ORAL
Status: DISCONTINUED | OUTPATIENT
Start: 2018-02-17 | End: 2018-02-20 | Stop reason: HOSPADM

## 2018-02-17 RX ORDER — ALPRAZOLAM 0.25 MG/1
0.25 TABLET ORAL ONCE
Status: COMPLETED | OUTPATIENT
Start: 2018-02-17 | End: 2018-02-18

## 2018-02-17 RX ORDER — IBUPROFEN 200 MG
24 TABLET ORAL
Status: DISCONTINUED | OUTPATIENT
Start: 2018-02-18 | End: 2018-02-20 | Stop reason: HOSPADM

## 2018-02-17 RX ORDER — RAMELTEON 8 MG/1
8 TABLET ORAL NIGHTLY PRN
Status: DISCONTINUED | OUTPATIENT
Start: 2018-02-17 | End: 2018-02-20 | Stop reason: HOSPADM

## 2018-02-17 RX ORDER — INSULIN ASPART 100 [IU]/ML
0-5 INJECTION, SOLUTION INTRAVENOUS; SUBCUTANEOUS
Status: DISCONTINUED | OUTPATIENT
Start: 2018-02-18 | End: 2018-02-20 | Stop reason: HOSPADM

## 2018-02-17 RX ORDER — IBUPROFEN 200 MG
16 TABLET ORAL
Status: DISCONTINUED | OUTPATIENT
Start: 2018-02-18 | End: 2018-02-20 | Stop reason: HOSPADM

## 2018-02-17 RX ORDER — GLUCAGON 1 MG
1 KIT INJECTION
Status: DISCONTINUED | OUTPATIENT
Start: 2018-02-18 | End: 2018-02-20 | Stop reason: HOSPADM

## 2018-02-17 RX ADMIN — HYDRALAZINE HYDROCHLORIDE 25 MG: 25 TABLET, FILM COATED ORAL at 10:02

## 2018-02-17 RX ADMIN — CHOLECALCIFEROL TAB 25 MCG (1000 UNIT) 1000 UNITS: 25 TAB at 08:02

## 2018-02-17 RX ADMIN — HYDRALAZINE HYDROCHLORIDE 25 MG: 25 TABLET, FILM COATED ORAL at 05:02

## 2018-02-17 RX ADMIN — SIMVASTATIN 20 MG: 10 TABLET, FILM COATED ORAL at 08:02

## 2018-02-17 RX ADMIN — LEVOTHYROXINE SODIUM 225 MCG: 125 TABLET ORAL at 05:02

## 2018-02-17 RX ADMIN — FUROSEMIDE 20 MG: 10 INJECTION, SOLUTION INTRAVENOUS at 01:02

## 2018-02-17 RX ADMIN — CEFTRIAXONE SODIUM 1 G: 1 INJECTION, POWDER, FOR SOLUTION INTRAMUSCULAR; INTRAVENOUS at 05:02

## 2018-02-17 RX ADMIN — HYDROCODONE BITARTRATE AND ACETAMINOPHEN 1 TABLET: 5; 325 TABLET ORAL at 08:02

## 2018-02-17 RX ADMIN — LOSARTAN POTASSIUM 100 MG: 25 TABLET, FILM COATED ORAL at 08:02

## 2018-02-17 RX ADMIN — METOPROLOL TARTRATE 50 MG: 50 TABLET ORAL at 08:02

## 2018-02-17 RX ADMIN — ASPIRIN 81 MG CHEWABLE TABLET 81 MG: 81 TABLET CHEWABLE at 08:02

## 2018-02-17 RX ADMIN — PANTOPRAZOLE SODIUM 40 MG: 40 TABLET, DELAYED RELEASE ORAL at 08:02

## 2018-02-17 RX ADMIN — FUROSEMIDE 20 MG: 20 TABLET ORAL at 08:02

## 2018-02-17 RX ADMIN — HYDRALAZINE HYDROCHLORIDE 25 MG: 25 TABLET, FILM COATED ORAL at 01:02

## 2018-02-17 RX ADMIN — CITALOPRAM HYDROBROMIDE 10 MG: 10 TABLET ORAL at 08:02

## 2018-02-17 RX ADMIN — FERROUS SULFATE TAB EC 325 MG (65 MG FE EQUIVALENT) 325 MG: 325 (65 FE) TABLET DELAYED RESPONSE at 08:02

## 2018-02-17 RX ADMIN — FUROSEMIDE 20 MG: 10 INJECTION, SOLUTION INTRAVENOUS at 04:02

## 2018-02-17 NOTE — H&P
"PCP: Hayes Jean MD  Progress Note    Chief Complaint: Generalized fatigue    History of Present Illness:  Patient is a 89 y.o. female admitted to Hospitalist Service from [facility] Room with complaint of generalized fatigue. Patient reportedly has past medical history significant for hypertension, hyperlipidemia, dementia, Aortic stenosis. Part of the history obtained from daughter. Patient has been under care of [facility] at an assisted living facility. Daughter has revoked hospice. Patient has history of multiple transfusions in the past. Her daughter reports this began a few weeks ago. [facility] tried to transfer her from her bed today and she collapsed. Her daughter noted she was "purple" and placed her on O2. Pt had a similar episode 08/2017. She received a blood transfusion that seemed to alleviate her symptoms. No obvious blood loss or black stools, melena or hematuria reported. Patient denied chest pain, shortness of breath, abdominal pain, nausea, vomiting, headache, vision changes, focal neuro-deficits, cough or fever.    SUBJECTIVE:  No complaints this morning, pleasant and confused. Denies any pain. No chest pain, SOB, fevers, chills,cogh, fevers, or chills.     Past Medical History:   Diagnosis Date    Dementia     Depression     Diabetes mellitus     Encounter for blood transfusion     GERD (gastroesophageal reflux disease)     Hyperlipidemia     Hypertension     Thyroid disease      Past Surgical History:   Procedure Laterality Date    APPENDECTOMY      CHOLECYSTECTOMY       Family History   Problem Relation Age of Onset    Heart disease Brother     Hypertension Brother     COPD Son     Heart disease Son      Social History   Substance Use Topics    Smoking status: Never Smoker    Smokeless tobacco: Never Used    Alcohol use No      Review of patient's allergies indicates:   Allergen Reactions    Devante-1     Codeine     Novocain [procaine]  "     PTA Medications   Medication Sig    aspirin 81 MG Chew Take 81 mg by mouth once daily.    b complex vitamins tablet Take 1 tablet by mouth once daily.    citalopram (CELEXA) 10 MG tablet Take 10 mg by mouth once daily.    doxycycline (VIBRAMYCIN) 100 MG Cap Take 100 mg by mouth every other day.    ferrous sulfate 325 mg (65 mg iron) Tab tablet Take 325 mg by mouth daily with breakfast.    furosemide (LASIX) 20 MG tablet Take 20 mg by mouth every other day.    hydrALAZINE (APRESOLINE) 25 MG tablet Take 25 mg by mouth 3 (three) times daily.    insulin glargine (LANTUS) 100 unit/mL injection Inject 25 Units into the skin every evening.     levothyroxine (SYNTHROID, LEVOTHROID) 175 MCG tablet Take 1 tablet (175 mcg total) by mouth once daily.    losartan (COZAAR) 100 MG tablet Take 100 mg by mouth once daily.    metoprolol tartrate (LOPRESSOR) 50 MG tablet Take 50 mg by mouth 2 (two) times daily.    naftifine (NAFTIN) 1 % cream Apply topically every evening. Apply to feet    omeprazole (PRILOSEC) 40 MG capsule Take 40 mg by mouth once daily.    simvastatin (ZOCOR) 20 MG tablet Take 20 mg by mouth every evening.    vitamin D 1000 units Tab Take 1,000 Units by mouth once daily.    hydrocodone-acetaminophen 5-325mg (NORCO) 5-325 mg per tablet Take 1 tablet by mouth every 6 (six) hours as needed for Pain.    hydrocortisone-pramoxine (PROCTOFOAM-HS) rectal foam Place 1 applicator rectally 4 (four) times daily.    ondansetron (ZOFRAN) 4 MG tablet Take 4 mg by mouth every 8 (eight) hours as needed for Nausea.    oxycodone (ROXICODONE) 5 MG immediate release tablet Take 5 mg by mouth every 4 (four) hours as needed for Pain.     Review of Systems: Limited historian due to dementia  Constitutional: no fever or chills. + profound weakness  Eyes: no visual changes  Ears, nose, mouth, throat, and face: no nasal congestion or sore throat  Respiratory: no cough or shorness of breath  Cardiovascular: no chest  pain or palpitations  Gastrointestinal: no nausea or vomiting, no abdominal pain or change in bowel habits  Genitourinary: no hematuria or dysuria  Integument/breast: no rash or pruritis  Hematologic/lymphatic: no easy bruising or lymphadenopathy. + History of transfusions  Musculoskeletal: no arthralgias or myalgias  Neurological: no seizures or tremors.  Behavioral/Psych: no auditory or visual hallucinations  Endocrine: no heat or cold intolerance     OBJECTIVE:     Vital Signs (Most Recent)  Temp: 98.4 °F (36.9 °C) (02/17/18 1219)  Pulse: 63 (02/17/18 1219)  Resp: 18 (02/17/18 1219)  BP: (!) 165/78 (02/17/18 1219)  SpO2: 96 % (02/17/18 1219)    Physical Exam:  General appearance: well developed, appears stated age, elderly frail female  Head: normocephalic, atraumatic  Eyes:  Pallor conjunctivae/corneas clear. PERRL.  Nose: Nares normal. Septum midline.  Throat: lips, mucosa, and tongue normal; teeth and gums normal, no throat erythema.  Neck: supple, symmetrical, trachea midline, no JVD and thyroid not enlarged, symmetric, no tenderness/mass/nodules  Lungs:  clear to auscultation bilaterally and normal respiratory effort  Chest wall: no tenderness  Heart: regular rate and rhythm, S1, S2 normal, 4/5 KIMBERLI  Abdomen: soft, non-tender non-distented; bowel sounds normal; no masses,  no organomegaly  Extremities: no cyanosis, clubbing or edema.   Pulses: 2+ and symmetric  Skin: Skin color, texture, turgor normal. No rashes or lesions.  Lymph nodes: Cervical, supraclavicular, and axillary nodes normal.  Neurologic: Grossly non-focal. Nonverbal. Opens eyes to voice. Knows her name. Follows commands but not alert to time or place    Laboratory:   CBC:     Recent Labs  Lab 02/17/18  0552   WBC 7.10   RBC 3.71*   HGB 10.6*   HCT 31.9*      MCV 86   MCH 28.7   MCHC 33.3     CMP:     Recent Labs  Lab 02/17/18  0552   *   CALCIUM 8.5*   ALBUMIN 2.9*   PROT 6.5      K 4.0   CO2 27   CL 99   BUN 33*    CREATININE 1.3   ALKPHOS 47*   ALT 8*   AST 14   BILITOT 0.7     Microbiology Results (last 7 days)     Procedure Component Value Units Date/Time    Urine culture [[id number]] Collected:  02/16/18 1603    Order Status:  Sent Specimen:  Urine from Urine, Catheterized Updated:  02/16/18 2123    Urine culture [[id number]]     Order Status:  No result Specimen:  Urine           Recent Labs  Lab 02/16/18  1516   COLORU Yellow   SPECGRAV 1.020   PHUR 6.0   PROTEINUA Negative   BACTERIA Many*   NITRITE Positive*   LEUKOCYTESUR 2+*   UROBILINOGEN Negative       Hemoglobin A1C   Date Value Ref Range Status   08/11/2017 7.0 (H) 4.0 - 5.6 % Final     Comment:     According to ADA guidelines, hemoglobin A1c <7.0% represents  optimal control in non-pregnant diabetic patients. Different  metrics may apply to specific patient populations.   Standards of Medical Care in Diabetes-2016.  For the purpose of screening for the presence of diabetes:  <5.7%     Consistent with the absence of diabetes  5.7-6.4%  Consistent with increasing risk for diabetes   (prediabetes)  >or=6.5%  Consistent with diabetes  Currently, no consensus exists for use of hemoglobin A1c  for diagnosis of diabetes for children.  This Hemoglobin A1c assay has significant interference with fetal   hemoglobin   (HbF). The results are invalid for patients with abnormal amounts of   HbF,   including those with known Hereditary Persistence   of Fetal Hemoglobin. Heterozygous hemoglobin variants (HbAS, HbAC,   HbAD, HbAE, HbA2) do not significantly interfere with this assay;   however, presence of multiple variants in a sample may impact the %   interference.       Microbiology Results (last 7 days)     Procedure Component Value Units Date/Time    Urine culture [[id number]] Collected:  02/16/18 1603    Order Status:  Sent Specimen:  Urine from Urine, Catheterized Updated:  02/16/18 2123    Urine culture [[id number]]     Order Status:  No result Specimen:  Urine          Diagnostic Results:  Chest X-Ray: No acute cardiopulmonary disease    Assessment/Plan:     Severe Symptomatic anemia  Responded well to trasnfusions and more awake now  Iron sats are low    UTI  Urine C/S. Continue V Rocephin.    Debility - multifactorial (advancing age/dementia/profound hypothyroidism, severe AS/Severe anemia)  Supportive care.  Fall precautions.    Moderate Malnutrition  Nutrition consulted. Encourage maximal PO intake. Diet supplementation ordered per nutrition approval. Will encourage PO and monitor closely for weight changes.    Atrial fibrillation , controlled     Atrial Fibrillation uncontrolled currently with Beta Blocker and Calcium Channel Blocker. [id number] score 4. Anticoagulation not indicated currently due to patient's anemia.          Adult myxedema     Patient with severe hypothyroidism. Increase Levothyroxine 225 mcg po q day                    Depression     Chronic problem, controlled. Will continue chronic medication(s), and monitor for any changes, adjusting as needed.          Nonrheumatic aortic valve stenosis     Noted.        Type 2 diabetes mellitus without complication     Check blood glucose level q AC/HS.  Use Novolog Insulin Sliding Scale as needed.   Continue American Diabetic Association 1800 Kcal diet.         Essential hypertension     Chronic problem. Will continue chronic medications and monitor for any changes, adjusting as needed.     DVT prophylaxis: Use SCD and TEDs. Hold anticoagulation given anemia on admission    Code Status: DNR - confirmed from daughter.    Day Sharp MD  Department of Hospital Medicine   [facility]

## 2018-02-17 NOTE — PROGRESS NOTES
1st unit of PRBCs complete at this time. No adverse reaction noted. No s/s of infection. Afebrile. Kiko well. Will cont to monitor.

## 2018-02-17 NOTE — PLAN OF CARE
Problem: Patient Care Overview  Goal: Plan of Care Review  Outcome: Ongoing (interventions implemented as appropriate)  Pt resting in bed, easily aroused. Alert to self, confused to time, place & situation. 3rd unit of PRBCs complete at this time. No adverse reaction noted. No s/s of infection. Afebrile. Kiko well. Telemetry. SCDs. Reg diet. No c/o pain or distress. Call light in reach. Bed alarm in place. Will cont to monitor.

## 2018-02-17 NOTE — PROGRESS NOTES
3rd unit of PRBCs initiated at this time. No adverse reaction noted. No s/s of infection. Afebrile. Kiko well. Will cont to monitor.

## 2018-02-17 NOTE — PLAN OF CARE
Pt admitted to unit. Tele in place. First unit of blood infusing. VSS. Pt tolerating without problem. Family at bedside. Will continue to monitor.

## 2018-02-17 NOTE — PROGRESS NOTES
2nd unit of PRBCs initiated at this time. No adverse reaction noted. No s/s of infection. Afebrile. Kiko well. Will cont to monitor.

## 2018-02-17 NOTE — PROGRESS NOTES
2nd unit of PRBCs complete at this time. No adverse reaction noted. No s/s of infection. Afebrile. Kiko well. Will cont to monitor.

## 2018-02-17 NOTE — PROGRESS NOTES
Pt resting in bed, easily aroused. 1st unit of PRBCs infusing at this time. No adverse reaction noted. No s/s of infection. Afebrile. Kiko well. Will cont to monitor.

## 2018-02-17 NOTE — PLAN OF CARE
Discharge planner spoke w/ patient's daughter Mayra Kingston to complete discharge assessment. Daughter has MPOA and would like to be contacted at the time of discharge to arrange for transportation. Daughter has concerns with patient not being able to ambulate; requesting PT evaluation. Daugther denies any inpatient admissions less than 30 days.  Patient has hx of alzheimer's disease, resides at [facility] assisted living with active hospice care with [facility]. Daughter denies any dc needs at this time, case management to continue to follow.    2/17/18 1103   Discharge Assessment   Assessment Type Discharge Planning Assessment   Confirmed/corrected address and phone number on face sheet? Yes   Assessment information obtained from? Daughter    Communicated expected length of stay with patient/caregiver Yes, 3-5 days    Type of Healthcare Directive Received MPOA    If Healthcare Directive is received, is it scanned into Epic? no (comment)   Prior to hospitalization cognitive status: Alert   Prior to hospitalization functional status: Assistive Equipment   Current cognitive status: Alert   Current Functional Status: Assistive Equipment   Arrived From Assisted living    Able to Return to Prior Arrangements Yes    Discharge Plan A Assisted living    Discharge Plan B Hospice Care   Patient/Family In Agreement With Plan Yes

## 2018-02-18 LAB
ALBUMIN SERPL BCP-MCNC: 3.1 G/DL
ALP SERPL-CCNC: 61 U/L
ALT SERPL W/O P-5'-P-CCNC: 10 U/L
ANION GAP SERPL CALC-SCNC: 8 MMOL/L
AST SERPL-CCNC: 14 U/L
BASOPHILS # BLD AUTO: 0 K/UL
BASOPHILS NFR BLD: 0.4 %
BILIRUB SERPL-MCNC: 0.4 MG/DL
BUN SERPL-MCNC: 33 MG/DL
CALCIUM SERPL-MCNC: 9.4 MG/DL
CHLORIDE SERPL-SCNC: 96 MMOL/L
CO2 SERPL-SCNC: 29 MMOL/L
CREAT SERPL-MCNC: 1.5 MG/DL
DIFFERENTIAL METHOD: ABNORMAL
EOSINOPHIL # BLD AUTO: 0.1 K/UL
EOSINOPHIL NFR BLD: 2 %
ERYTHROCYTE [DISTWIDTH] IN BLOOD BY AUTOMATED COUNT: 18.7 %
EST. GFR  (AFRICAN AMERICAN): 34 ML/MIN/1.73 M^2
EST. GFR  (NON AFRICAN AMERICAN): 30 ML/MIN/1.73 M^2
ESTIMATED AVG GLUCOSE: 174 MG/DL
GLUCOSE SERPL-MCNC: 253 MG/DL
HBA1C MFR BLD HPLC: 7.7 %
HCT VFR BLD AUTO: 32.5 %
HGB BLD-MCNC: 11 G/DL
LYMPHOCYTES # BLD AUTO: 1.6 K/UL
LYMPHOCYTES NFR BLD: 22.5 %
MCH RBC QN AUTO: 29 PG
MCHC RBC AUTO-ENTMCNC: 33.8 G/DL
MCV RBC AUTO: 86 FL
MONOCYTES # BLD AUTO: 0.5 K/UL
MONOCYTES NFR BLD: 7.6 %
NEUTROPHILS # BLD AUTO: 4.8 K/UL
NEUTROPHILS NFR BLD: 67.5 %
PLATELET # BLD AUTO: 182 K/UL
PMV BLD AUTO: 9.1 FL
POCT GLUCOSE: 241 MG/DL (ref 70–110)
POCT GLUCOSE: 282 MG/DL (ref 70–110)
POCT GLUCOSE: 328 MG/DL (ref 70–110)
POTASSIUM SERPL-SCNC: 4.4 MMOL/L
PROT SERPL-MCNC: 7 G/DL
RBC # BLD AUTO: 3.79 M/UL
SODIUM SERPL-SCNC: 133 MMOL/L
WBC # BLD AUTO: 7.1 K/UL

## 2018-02-18 PROCEDURE — G8978 MOBILITY CURRENT STATUS: HCPCS | Mod: CM

## 2018-02-18 PROCEDURE — 25000003 PHARM REV CODE 250: Performed by: EMERGENCY MEDICINE

## 2018-02-18 PROCEDURE — 80053 COMPREHEN METABOLIC PANEL: CPT

## 2018-02-18 PROCEDURE — 85025 COMPLETE CBC W/AUTO DIFF WBC: CPT

## 2018-02-18 PROCEDURE — 12000002 HC ACUTE/MED SURGE SEMI-PRIVATE ROOM

## 2018-02-18 PROCEDURE — 97161 PT EVAL LOW COMPLEX 20 MIN: CPT

## 2018-02-18 PROCEDURE — 25000003 PHARM REV CODE 250: Performed by: INTERNAL MEDICINE

## 2018-02-18 PROCEDURE — 63600175 PHARM REV CODE 636 W HCPCS: Performed by: NURSE PRACTITIONER

## 2018-02-18 PROCEDURE — 36415 COLL VENOUS BLD VENIPUNCTURE: CPT

## 2018-02-18 PROCEDURE — 63600175 PHARM REV CODE 636 W HCPCS: Performed by: INTERNAL MEDICINE

## 2018-02-18 PROCEDURE — G8979 MOBILITY GOAL STATUS: HCPCS | Mod: CJ

## 2018-02-18 PROCEDURE — 25000003 PHARM REV CODE 250: Performed by: NURSE PRACTITIONER

## 2018-02-18 RX ADMIN — INSULIN ASPART 5 UNITS: 100 INJECTION, SOLUTION INTRAVENOUS; SUBCUTANEOUS at 11:02

## 2018-02-18 RX ADMIN — ASPIRIN 81 MG CHEWABLE TABLET 81 MG: 81 TABLET CHEWABLE at 08:02

## 2018-02-18 RX ADMIN — PANTOPRAZOLE SODIUM 40 MG: 40 TABLET, DELAYED RELEASE ORAL at 08:02

## 2018-02-18 RX ADMIN — INSULIN ASPART 1 UNITS: 100 INJECTION, SOLUTION INTRAVENOUS; SUBCUTANEOUS at 08:02

## 2018-02-18 RX ADMIN — RAMELTEON 8 MG: 8 TABLET, FILM COATED ORAL at 12:02

## 2018-02-18 RX ADMIN — METOPROLOL TARTRATE 50 MG: 50 TABLET ORAL at 08:02

## 2018-02-18 RX ADMIN — CITALOPRAM HYDROBROMIDE 10 MG: 10 TABLET ORAL at 08:02

## 2018-02-18 RX ADMIN — HYDRALAZINE HYDROCHLORIDE 25 MG: 25 TABLET, FILM COATED ORAL at 09:02

## 2018-02-18 RX ADMIN — LEVOTHYROXINE SODIUM 225 MCG: 125 TABLET ORAL at 05:02

## 2018-02-18 RX ADMIN — INSULIN ASPART 2 UNITS: 100 INJECTION, SOLUTION INTRAVENOUS; SUBCUTANEOUS at 05:02

## 2018-02-18 RX ADMIN — RAMELTEON 8 MG: 8 TABLET, FILM COATED ORAL at 08:02

## 2018-02-18 RX ADMIN — INSULIN ASPART 3 UNITS: 100 INJECTION, SOLUTION INTRAVENOUS; SUBCUTANEOUS at 06:02

## 2018-02-18 RX ADMIN — CEFTRIAXONE SODIUM 1 G: 1 INJECTION, POWDER, FOR SOLUTION INTRAMUSCULAR; INTRAVENOUS at 05:02

## 2018-02-18 RX ADMIN — SIMVASTATIN 20 MG: 10 TABLET, FILM COATED ORAL at 08:02

## 2018-02-18 RX ADMIN — FERROUS SULFATE TAB EC 325 MG (65 MG FE EQUIVALENT) 325 MG: 325 (65 FE) TABLET DELAYED RESPONSE at 08:02

## 2018-02-18 RX ADMIN — STANDARDIZED SENNA CONCENTRATE AND DOCUSATE SODIUM 1 TABLET: 8.6; 5 TABLET, FILM COATED ORAL at 08:02

## 2018-02-18 RX ADMIN — ALPRAZOLAM 0.25 MG: 0.25 TABLET ORAL at 12:02

## 2018-02-18 RX ADMIN — LOSARTAN POTASSIUM 100 MG: 25 TABLET, FILM COATED ORAL at 08:02

## 2018-02-18 RX ADMIN — HYDRALAZINE HYDROCHLORIDE 25 MG: 25 TABLET, FILM COATED ORAL at 05:02

## 2018-02-18 RX ADMIN — CHOLECALCIFEROL TAB 25 MCG (1000 UNIT) 1000 UNITS: 25 TAB at 08:02

## 2018-02-18 NOTE — PLAN OF CARE
Problem: Patient Care Overview  Goal: Plan of Care Review  Outcome: Ongoing (interventions implemented as appropriate)  Pt pleasantly confused. NSR per cardiac monitor. Blood glucose monitored, prn insulin given. Sully sys on pt with bed alarm set. Sitter @ bedside for most of day. No difficulty swallowing. Tolerating diet. Pt able to ambulate with stand by assistance and walker. Bed locked and low. Nurse hourly rounded to ensure pt safety.

## 2018-02-18 NOTE — PT/OT/SLP EVAL
Physical Therapy Evaluation    Patient Name:  Lulú Eugene   MRN:  [id number]    Recommendations:     Discharge Recommendations:  home, nursing facility, skilled   Discharge Equipment Recommendations: walker, rolling   Barriers to discharge: None    Assessment:     Lulú Eugene is a 89 y.o. female admitted with a medical diagnosis of <principal problem not specified>.  She presents with the following impairments/functional limitations:  weakness, impaired endurance, gait instability, impaired functional mobilty, impaired cognition ,confused.    Rehab Prognosis:  good; patient would benefit from acute skilled PT services to address these deficits and reach maximum level of function.      Recent Surgery: * No surgery found *      Plan:     During this hospitalization, patient to be seen 6 x/week to address the above listed problems via gait training, therapeutic activities, therapeutic exercises  · Plan of Care Expires:  02/26/18   Plan of Care Reviewed with: patient, daughter    Subjective     Communicated with RN prior to session.  Patient found up in chair upon PT entry to room, agreeable to evaluation.      Chief Complaint: weakness  Patient comments/goals: go home.  Pain/Comfort:  · Pain Rating 1: 0/10  · Pain Rating Post-Intervention 1: 0/10    Patients cultural, spiritual, Mu-ism conflicts given the current situation:      Living Environment:  In apt with a friend.  Prior to admission, patients level of function was independent.  Patient has the following equipment: none.  DME owned (not currently used): none.  Upon discharge, patient will have assistance from friend..    Objective:     Patient found with:       General Precautions: Standard,     Orthopedic Precautions:N/A   Braces:       Exams:  · Cognitive Exam:  Patient is oriented to Situation and follows 50% of all commands   · Gross Motor Coordination:  WFL  · Sensation:    · -       Intact  · RLE ROM: WFL  · RLE Strength: WFL  · LLE ROM:  WFL  · LLE Strength: WFL    Functional Mobility:  · Transfers:     · Sit to Stand:  minimum assistance with rolling walker  · Gait: 100' with RW with mod/min A   · Balance: fair    AM-PAC 6 CLICK MOBILITY  Total Score:        Therapeutic Activities and Exercises:       Patient left up in chair with all lines intact, call button in reach and daughter present.    GOALS:    Physical Therapy Goals        Problem: Physical Therapy Goal           Problem: Physical Therapy Goal    Goal Priority Disciplines Outcome Goal Variances Interventions   Physical Therapy Goal     PT/OT, PT      Description:  1.Pt will be independent with bed mobility and transfers.  2.Pt will ambulate 250'+with AD with CGA.                    History:     Past Medical History:   Diagnosis Date    Dementia     Depression     Diabetes mellitus     Encounter for blood transfusion     GERD (gastroesophageal reflux disease)     Hyperlipidemia     Hypertension     Thyroid disease        Past Surgical History:   Procedure Laterality Date    APPENDECTOMY      CHOLECYSTECTOMY         Clinical Decision Making:     History  Co-morbidities and personal factors that may impact the plan of care Examination  Body Structures and Functions, activity limitations and participation restrictions that may impact the plan of care Clinical Presentation   Decision Making/ Complexity Score   Co-morbidities:   [] Time since onset of injury / illness / exacerbation  [] Status of current condition  []Patient's cognitive status and safety concerns    [] Multiple Medical Problems (see med hx)  Personal Factors:   [] Patient's age  [] Prior Level of function   [] Patient's home situation (environment and family support)  [] Patient's level of motivation  [] Expected progression of patient      HISTORY:(criteria)    [] 70195 - no personal factors/history    [] 36759 - has 1-2 personal factor/comorbidity     [] 50292 - has >3 personal factor/comorbidity     Body  Regions:  [] Objective examination findings  [] Head     []  Neck  [] Trunk   [] Upper Extremity  [] Lower Extremity    Body Systems:  [] For communication ability, affect, cognition, language, and learning style: the assessment of the ability to make needs known, consciousness, orientation (person, place, and time), expected emotional /behavioral responses, and learning preferences (eg, learning barriers, education  needs)  [] For the neuromuscular system: a general assessment of gross coordinated movement (eg, balance, gait, locomotion, transfers, and transitions) and motor function  (motor control and motor learning)  [] For the musculoskeletal system: the assessment of gross symmetry, gross range of motion, gross strength, height, and weight  [] For the integumentary system: the assessment of pliability(texture), presence of scar formation, skin color, and skin integrity  [] For cardiovascular/pulmonary system: the assessment of heart rate, respiratory rate, blood pressure, and edema     Activity limitations:    [] Patient's cognitive status and saf ety concerns          [] Status of current condition      [] Weight bearing restriction  [] Cardiopulmunary Restriction    Participation Restrictions:   [] Goals and goal agreement with the patient     [] Rehab potential (prognosis) and probable outcome      Examination of Body System: (criteria)    [] 63127 - addressing 1-2 elements    [] 05584 - addressing a total of 3 or more elements     [] 89508 -  Addressing a total of 4 or more elements         Clinical Presentation: (criteria)  Choose one     On examination of body system using standardized tests and measures patient presents with (CHOOSE ONE) elements from any of the following: body structures and functions, activity limitations, and/or participation restrictions.  Leading to a clinical presentation that is considered (CHOOSE ONE)                              Clinical Decision Making  (Eval Complexity):   Choose One     Time Tracking:     PT Received On: 02/18/18  PT Start Time: 0912     PT Stop Time: 0927  PT Total Time (min): 15 min     Billable Minutes: Evaluation 15      James Ahuja, PT  02/18/2018

## 2018-02-18 NOTE — PROGRESS NOTES
"PCP: Hayes Jean MD  Progress Note    Chief Complaint: Generalized fatigue    History of Present Illness:  Patient is a 89 y.o. female admitted to Hospitalist Service from [facility] with complaint of generalized fatigue. Patient reportedly has past medical history significant for hypertension, hyperlipidemia, dementia, Aortic stenosis. Part of the history obtained from daughter. Patient has been under care of [facility] at an assisted living facility. Daughter has revoked hospice. Patient has history of multiple transfusions in the past. Her daughter reports this began a few weeks ago. [facility] tried to transfer her from her bed today and she collapsed. Her daughter noted she was "purple" and placed her on O2. Pt had a similar episode 08/2017. She received a blood transfusion that seemed to alleviate her symptoms. No obvious blood loss or black stools, melena or hematuria reported. Patient denied chest pain, shortness of breath, abdominal pain, nausea, vomiting, headache, vision changes, focal neuro-deficits, cough or fever.    SUBJECTIVE:  Did not sleep much last night, hanging out at nurses station. No new complaints. Denies any pain. No chest pain, SOB, fevers, chills,cogh, fevers, or chills.     Past Medical History:   Diagnosis Date    Dementia     Depression     Diabetes mellitus     Encounter for blood transfusion     GERD (gastroesophageal reflux disease)     Hyperlipidemia     Hypertension     Thyroid disease      Past Surgical History:   Procedure Laterality Date    APPENDECTOMY      CHOLECYSTECTOMY       Family History   Problem Relation Age of Onset    Heart disease Brother     Hypertension Brother     COPD Son     Heart disease Son      Social History   Substance Use Topics    Smoking status: Never Smoker    Smokeless tobacco: Never Used    Alcohol use No      Review of patient's allergies indicates:   Allergen Reactions    Devante-1     Codeine  "    Novocain [procaine]      PTA Medications   Medication Sig    aspirin 81 MG Chew Take 81 mg by mouth once daily.    b complex vitamins tablet Take 1 tablet by mouth once daily.    citalopram (CELEXA) 10 MG tablet Take 10 mg by mouth once daily.    doxycycline (VIBRAMYCIN) 100 MG Cap Take 100 mg by mouth every other day.    ferrous sulfate 325 mg (65 mg iron) Tab tablet Take 325 mg by mouth daily with breakfast.    furosemide (LASIX) 20 MG tablet Take 20 mg by mouth every other day.    hydrALAZINE (APRESOLINE) 25 MG tablet Take 25 mg by mouth 3 (three) times daily.    insulin glargine (LANTUS) 100 unit/mL injection Inject 25 Units into the skin every evening.     levothyroxine (SYNTHROID, LEVOTHROID) 175 MCG tablet Take 1 tablet (175 mcg total) by mouth once daily.    losartan (COZAAR) 100 MG tablet Take 100 mg by mouth once daily.    metoprolol tartrate (LOPRESSOR) 50 MG tablet Take 50 mg by mouth 2 (two) times daily.    naftifine (NAFTIN) 1 % cream Apply topically every evening. Apply to feet    omeprazole (PRILOSEC) 40 MG capsule Take 40 mg by mouth once daily.    simvastatin (ZOCOR) 20 MG tablet Take 20 mg by mouth every evening.    vitamin D 1000 units Tab Take 1,000 Units by mouth once daily.    hydrocodone-acetaminophen 5-325mg (NORCO) 5-325 mg per tablet Take 1 tablet by mouth every 6 (six) hours as needed for Pain.    hydrocortisone-pramoxine (PROCTOFOAM-HS) rectal foam Place 1 applicator rectally 4 (four) times daily.    ondansetron (ZOFRAN) 4 MG tablet Take 4 mg by mouth every 8 (eight) hours as needed for Nausea.    oxycodone (ROXICODONE) 5 MG immediate release tablet Take 5 mg by mouth every 4 (four) hours as needed for Pain.     Review of Systems: Limited historian due to dementia  Constitutional: no fever or chills. + profound weakness  Eyes: no visual changes  Ears, nose, mouth, throat, and face: no nasal congestion or sore throat  Respiratory: no cough or shorness of  breath  Cardiovascular: no chest pain or palpitations  Gastrointestinal: no nausea or vomiting, no abdominal pain or change in bowel habits  Genitourinary: no hematuria or dysuria  Integument/breast: no rash or pruritis  Hematologic/lymphatic: no easy bruising or lymphadenopathy. + History of transfusions  Musculoskeletal: no arthralgias or myalgias  Neurological: no seizures or tremors.  Behavioral/Psych: no auditory or visual hallucinations  Endocrine: no heat or cold intolerance     OBJECTIVE:     Vital Signs (Most Recent)  Temp: 97.7 °F (36.5 °C) (02/18/18 1200)  Pulse: 67 (02/18/18 1200)  Resp: 18 (02/18/18 1200)  BP: 131/67 (02/18/18 1200)  SpO2: 98 % (02/18/18 1200)    Physical Exam:  General appearance: well developed, appears stated age, elderly frail female  Head: normocephalic, atraumatic  Eyes:  Pallor conjunctivae/corneas clear. PERRL.  Nose: Nares normal. Septum midline.  Throat: lips, mucosa, and tongue normal; teeth and gums normal, no throat erythema.  Neck: supple, symmetrical, trachea midline, no JVD and thyroid not enlarged, symmetric, no tenderness/mass/nodules  Lungs:  clear to auscultation bilaterally and normal respiratory effort  Chest wall: no tenderness  Heart: regular rate and rhythm, S1, S2 normal, 4/5 KIMBERLI  Abdomen: soft, non-tender non-distented; bowel sounds normal; no masses,  no organomegaly  Extremities: no cyanosis, clubbing or edema.   Pulses: 2+ and symmetric  Skin: Skin color, texture, turgor normal. No rashes or lesions.  Lymph nodes: Cervical, supraclavicular, and axillary nodes normal.  Neurologic: Grossly non-focal. Nonverbal. Opens eyes to voice. Knows her name. Follows commands but not alert to time or place    Laboratory:   CBC:     Recent Labs  Lab 02/18/18  0559   WBC 7.10   RBC 3.79*   HGB 11.0*   HCT 32.5*      MCV 86   MCH 29.0   MCHC 33.8     CMP:     Recent Labs  Lab 02/18/18  0559   *   CALCIUM 9.4   ALBUMIN 3.1*   PROT 7.0   *   K 4.4   CO2 29    CL 96   BUN 33*   CREATININE 1.5*   ALKPHOS 61   ALT 10   AST 14   BILITOT 0.4     Microbiology Results (last 7 days)     Procedure Component Value Units Date/Time    Urine culture [[id number]] Collected:  02/16/18 1603    Order Status:  Completed Specimen:  Urine from Urine, Catheterized Updated:  02/18/18 0834     Urine Culture, Routine --     PRESUMPTIVE E COLI  >100,000 cfu/ml  Identification and susceptibility pending      Urine culture [[id number]]     Order Status:  Canceled Specimen:  Urine           Recent Labs  Lab 02/16/18  1516   COLORU Yellow   SPECGRAV 1.020   PHUR 6.0   PROTEINUA Negative   BACTERIA Many*   NITRITE Positive*   LEUKOCYTESUR 2+*   UROBILINOGEN Negative       Hemoglobin A1C   Date Value Ref Range Status   02/17/2018 7.7 (H) 4.0 - 5.6 % Final     Comment:     According to ADA guidelines, hemoglobin A1c <7.0% represents  optimal control in non-pregnant diabetic patients. Different  metrics may apply to specific patient populations.   Standards of Medical Care in Diabetes-2016.  For the purpose of screening for the presence of diabetes:  <5.7%     Consistent with the absence of diabetes  5.7-6.4%  Consistent with increasing risk for diabetes   (prediabetes)  >or=6.5%  Consistent with diabetes  Currently, no consensus exists for use of hemoglobin A1c  for diagnosis of diabetes for children.  This Hemoglobin A1c assay has significant interference with fetal   hemoglobin   (HbF). The results are invalid for patients with abnormal amounts of   HbF,   including those with known Hereditary Persistence   of Fetal Hemoglobin. Heterozygous hemoglobin variants (HbAS, HbAC,   HbAD, HbAE, HbA2) do not significantly interfere with this assay;   however, presence of multiple variants in a sample may impact the %   interference.     08/11/2017 7.0 (H) 4.0 - 5.6 % Final     Comment:     According to ADA guidelines, hemoglobin A1c <7.0% represents  optimal control in non-pregnant diabetic patients.  Different  metrics may apply to specific patient populations.   Standards of Medical Care in Diabetes-2016.  For the purpose of screening for the presence of diabetes:  <5.7%     Consistent with the absence of diabetes  5.7-6.4%  Consistent with increasing risk for diabetes   (prediabetes)  >or=6.5%  Consistent with diabetes  Currently, no consensus exists for use of hemoglobin A1c  for diagnosis of diabetes for children.  This Hemoglobin A1c assay has significant interference with fetal   hemoglobin   (HbF). The results are invalid for patients with abnormal amounts of   HbF,   including those with known Hereditary Persistence   of Fetal Hemoglobin. Heterozygous hemoglobin variants (HbAS, HbAC,   HbAD, HbAE, HbA2) do not significantly interfere with this assay;   however, presence of multiple variants in a sample may impact the %   interference.       Microbiology Results (last 7 days)     Procedure Component Value Units Date/Time    Urine culture [[id number]] Collected:  02/16/18 1603    Order Status:  Completed Specimen:  Urine from Urine, Catheterized Updated:  02/18/18 0834     Urine Culture, Routine --     PRESUMPTIVE E COLI  >100,000 cfu/ml  Identification and susceptibility pending      Urine culture [[id number]]     Order Status:  Canceled Specimen:  Urine         Diagnostic Results:  Chest X-Ray: No acute cardiopulmonary disease    Assessment/Plan:     Severe Symptomatic anemia  Responded well to trasnfusions, H/H stable this morning and no further drops  Iron sats are low, continue iron supplements    UTI  E coli, awaiting Urine C/S. Continue V Rocephin.    Debility - multifactorial (advancing age/dementia/profound hypothyroidism, severe AS/Severe anemia)  Supportive care.  Fall precautions.    Moderate Malnutrition  Nutrition consulted. Encourage maximal PO intake. Diet supplementation ordered per nutrition approval. Will encourage PO and monitor closely for weight changes.    Atrial fibrillation ,  controlled     Rate controlled, continue metoprolol  [id number] score 4. Anticoagulation not indicated currently due to patient's anemia.          Adult myxedema     Patient with severe hypothyroidism. Increased Levothyroxine 225 mcg po q day                    Depression     Chronic problem, controlled. Will continue chronic medication(s), and monitor for any changes, adjusting as needed.          Nonrheumatic aortic valve stenosis     Noted.        Type 2 diabetes mellitus without complication     Check blood glucose level q AC/HS.  Use Novolog Insulin Sliding Scale as needed.   Continue American Diabetic Association 1800 Kcal diet.         Essential hypertension     Chronic problem. Will continue chronic medications and monitor for any changes, adjusting as needed.     DVT prophylaxis: Use SCD and TEDs. Hold anticoagulation given anemia on admission    Code Status: DNR - confirmed from daughter. Likely can DC in am with PO abx    Day Sharp MD  Department of Hospital Medicine   [facility]

## 2018-02-18 NOTE — PLAN OF CARE
Problem: Patient Care Overview  Goal: Plan of Care Review  Outcome: Ongoing (interventions implemented as appropriate)  Pt remained free from falls or injury through shift. Pt pleasantly confused, very short memory/attention span. Multiple attempted to get out of bed. Bed alarm and avasys utilized. Pt sat in wheelchair @ nurse's station for a while. Pt ambulates with assistance. Eating well, tolerating meals. Normal sinus rhythm per cardiac monitor. teds on. Swallows meds whole without difficulty. Denied pain through shift. Nurse hourly rounded to ensure pt safety.

## 2018-02-18 NOTE — PROGRESS NOTES
02/18/18 1004   PT G-Codes   Functional Assessment Tool Used FIM   Mobility: Walking and Moving Around Current Status () CM   Mobility: Walking and Moving Around Goal Status () CJ   PT Evaluation   $PT Evaluation EVAL, LOW COMPLEXITY

## 2018-02-19 LAB
ALBUMIN SERPL BCP-MCNC: 2.7 G/DL
ALP SERPL-CCNC: 49 U/L
ALT SERPL W/O P-5'-P-CCNC: 10 U/L
ANION GAP SERPL CALC-SCNC: 9 MMOL/L
AST SERPL-CCNC: 11 U/L
BACTERIA UR CULT: NORMAL
BASOPHILS # BLD AUTO: 0 K/UL
BASOPHILS NFR BLD: 0.4 %
BILIRUB SERPL-MCNC: 0.3 MG/DL
BUN SERPL-MCNC: 28 MG/DL
CALCIUM SERPL-MCNC: 8.8 MG/DL
CHLORIDE SERPL-SCNC: 98 MMOL/L
CO2 SERPL-SCNC: 26 MMOL/L
CREAT SERPL-MCNC: 1.7 MG/DL
DIFFERENTIAL METHOD: ABNORMAL
EOSINOPHIL # BLD AUTO: 0.2 K/UL
EOSINOPHIL NFR BLD: 3.9 %
ERYTHROCYTE [DISTWIDTH] IN BLOOD BY AUTOMATED COUNT: 18.7 %
EST. GFR  (AFRICAN AMERICAN): 30 ML/MIN/1.73 M^2
EST. GFR  (NON AFRICAN AMERICAN): 26 ML/MIN/1.73 M^2
GLUCOSE SERPL-MCNC: 345 MG/DL
HCT VFR BLD AUTO: 30.7 %
HGB BLD-MCNC: 10.1 G/DL
LYMPHOCYTES # BLD AUTO: 1.5 K/UL
LYMPHOCYTES NFR BLD: 24.9 %
MCH RBC QN AUTO: 28.7 PG
MCHC RBC AUTO-ENTMCNC: 32.8 G/DL
MCV RBC AUTO: 88 FL
MONOCYTES # BLD AUTO: 0.6 K/UL
MONOCYTES NFR BLD: 10.2 %
NEUTROPHILS # BLD AUTO: 3.7 K/UL
NEUTROPHILS NFR BLD: 60.6 %
PLATELET # BLD AUTO: 168 K/UL
PMV BLD AUTO: 9.2 FL
POCT GLUCOSE: 173 MG/DL (ref 70–110)
POCT GLUCOSE: 285 MG/DL (ref 70–110)
POCT GLUCOSE: 341 MG/DL (ref 70–110)
POCT GLUCOSE: 365 MG/DL (ref 70–110)
POTASSIUM SERPL-SCNC: 3.9 MMOL/L
PROT SERPL-MCNC: 6.1 G/DL
RBC # BLD AUTO: 3.5 M/UL
SODIUM SERPL-SCNC: 133 MMOL/L
WBC # BLD AUTO: 6.1 K/UL

## 2018-02-19 PROCEDURE — 63600175 PHARM REV CODE 636 W HCPCS: Performed by: INTERNAL MEDICINE

## 2018-02-19 PROCEDURE — 36415 COLL VENOUS BLD VENIPUNCTURE: CPT

## 2018-02-19 PROCEDURE — 12000002 HC ACUTE/MED SURGE SEMI-PRIVATE ROOM

## 2018-02-19 PROCEDURE — 25000003 PHARM REV CODE 250: Performed by: INTERNAL MEDICINE

## 2018-02-19 PROCEDURE — 99232 SBSQ HOSP IP/OBS MODERATE 35: CPT | Mod: GW,HPC,, | Performed by: INTERNAL MEDICINE

## 2018-02-19 PROCEDURE — 85025 COMPLETE CBC W/AUTO DIFF WBC: CPT

## 2018-02-19 PROCEDURE — 97116 GAIT TRAINING THERAPY: CPT

## 2018-02-19 PROCEDURE — 97110 THERAPEUTIC EXERCISES: CPT

## 2018-02-19 PROCEDURE — 80053 COMPREHEN METABOLIC PANEL: CPT

## 2018-02-19 RX ADMIN — CITALOPRAM HYDROBROMIDE 10 MG: 10 TABLET ORAL at 09:02

## 2018-02-19 RX ADMIN — ASPIRIN 81 MG CHEWABLE TABLET 81 MG: 81 TABLET CHEWABLE at 09:02

## 2018-02-19 RX ADMIN — INSULIN ASPART 4 UNITS: 100 INJECTION, SOLUTION INTRAVENOUS; SUBCUTANEOUS at 06:02

## 2018-02-19 RX ADMIN — HYDRALAZINE HYDROCHLORIDE 25 MG: 25 TABLET, FILM COATED ORAL at 08:02

## 2018-02-19 RX ADMIN — CEFTRIAXONE SODIUM 1 G: 1 INJECTION, POWDER, FOR SOLUTION INTRAMUSCULAR; INTRAVENOUS at 04:02

## 2018-02-19 RX ADMIN — INSULIN ASPART 1 UNITS: 100 INJECTION, SOLUTION INTRAVENOUS; SUBCUTANEOUS at 08:02

## 2018-02-19 RX ADMIN — FUROSEMIDE 20 MG: 20 TABLET ORAL at 09:02

## 2018-02-19 RX ADMIN — HYDRALAZINE HYDROCHLORIDE 25 MG: 25 TABLET, FILM COATED ORAL at 01:02

## 2018-02-19 RX ADMIN — LOSARTAN POTASSIUM 100 MG: 25 TABLET, FILM COATED ORAL at 09:02

## 2018-02-19 RX ADMIN — CHOLECALCIFEROL TAB 25 MCG (1000 UNIT) 1000 UNITS: 25 TAB at 09:02

## 2018-02-19 RX ADMIN — METOPROLOL TARTRATE 50 MG: 50 TABLET ORAL at 09:02

## 2018-02-19 RX ADMIN — INSULIN DETEMIR 25 UNITS: 100 INJECTION, SOLUTION SUBCUTANEOUS at 08:02

## 2018-02-19 RX ADMIN — SIMVASTATIN 20 MG: 10 TABLET, FILM COATED ORAL at 08:02

## 2018-02-19 RX ADMIN — METOPROLOL TARTRATE 50 MG: 50 TABLET ORAL at 08:02

## 2018-02-19 RX ADMIN — FERROUS SULFATE TAB EC 325 MG (65 MG FE EQUIVALENT) 325 MG: 325 (65 FE) TABLET DELAYED RESPONSE at 09:02

## 2018-02-19 RX ADMIN — PANTOPRAZOLE SODIUM 40 MG: 40 TABLET, DELAYED RELEASE ORAL at 09:02

## 2018-02-19 RX ADMIN — INSULIN ASPART 5 UNITS: 100 INJECTION, SOLUTION INTRAVENOUS; SUBCUTANEOUS at 05:02

## 2018-02-19 RX ADMIN — LEVOTHYROXINE SODIUM 225 MCG: 125 TABLET ORAL at 06:02

## 2018-02-19 RX ADMIN — HYDRALAZINE HYDROCHLORIDE 25 MG: 25 TABLET, FILM COATED ORAL at 06:02

## 2018-02-19 NOTE — PLAN OF CARE
Discharge assessment completed by PILAR Ross. Margaux Sherwood LMSW     02/19/18 0846   Discharge Assessment   Assessment Type Discharge Planning Assessment   Confirmed/corrected address and phone number on facesheet? Yes   Assessment information obtained from? Caregiver

## 2018-02-19 NOTE — PLAN OF CARE
Problem: Patient Care Overview  Goal: Plan of Care Review  Outcome: Ongoing (interventions implemented as appropriate)  AA Oriented to self only. Incontinent. Pt in brief, care provided. No complaints of pain. CBG monitored and covered as needed. Teds and tele maintained. PRN sleep aid given. Tele sitter in use. Pt slept well through the night. Q2 hour rounding utilized. Pain and comfort assessed. Bed low, brakes locked, SR up, call light within reach. POC reviewed. Pt verbalized understanding. Will continue to monitor.

## 2018-02-19 NOTE — PLAN OF CARE
Problem: Physical Therapy Goal  Goal: Physical Therapy Goal  1.Pt will be independent with bed mobility and transfers.  2.Pt will ambulate 250'+with AD with CGA.   Outcome: Ongoing (interventions implemented as appropriate)  Bed mobility, transfers, ambulation with rw and CGA, LE exercises, requires A for safety.

## 2018-02-19 NOTE — PT/OT/SLP PROGRESS
Physical Therapy Treatment    Patient Name:  Lulú Eugene   MRN:  [id number]    Recommendations:     Discharge Recommendations:  nursing facility, skilled   Discharge Equipment Recommendations:     Barriers to discharge: None    Assessment:     Lulú Eugene is a 89 y.o. female admitted with a medical diagnosis of <principal problem not specified>.  She presents with the following impairments/functional limitations:  weakness, impaired endurance, gait instability, impaired functional mobilty, impaired cognition .    Rehab Prognosis:  good; patient would benefit from acute skilled PT services to address these deficits and reach maximum level of function.      Recent Surgery: * No surgery found *      Plan:     During this hospitalization, patient to be seen 6 x/week to address the above listed problems via gait training, therapeutic activities, therapeutic exercises  · Plan of Care Expires:  02/26/18   Plan of Care Reviewed with: patient    Subjective     Communicated with nurse Lynch prior to session.  Patient found supine in bed with nurse Marine present upon PT entry to room, agreeable to treatment.      Chief Complaint: none stated  Patient comments/goals: none stated  Pain/Comfort:  · Pain Rating 1: 0/10    Patients cultural, spiritual, Hoahaoism conflicts given the current situation:      Objective:     Patient found with: bed alarm (GEO SYS at bedside)     General Precautions: Standard,     Orthopedic Precautions:N/A   Braces: N/A     Functional Mobility:  · Bed Mobility:     · Rolling Left:  stand by assistance  · Supine to Sit: stand by assistance  · Sit to Supine: stand by assistance  · Transfers:     · Sit to Stand:  contact guard assistance with rolling walker  · Gait: 200' with rw and CGA.      AM-PAC 6 CLICK MOBILITY          Therapeutic Activities and Exercises:   Sat up in chair and performed LE exercises at 10 reps each ; TKE's, Hip abd/add/ flexion, AP's; marches, heel lifts. SPT with CGA,   BTB.    Patient left supine with all lines intact, call button in reach, bed alarm on, nurse Cris notified and GEO BIRD present..    GOALS:    Physical Therapy Goals        Problem: Physical Therapy Goal           Problem: Physical Therapy Goal    Goal Priority Disciplines Outcome Goal Variances Interventions   Physical Therapy Goal     PT/OT, PT      Description:  1.Pt will be independent with bed mobility and transfers.  2.Pt will ambulate 250'+with AD with CGA.                    Time Tracking:     PT Received On: 02/19/18  PT Start Time: 1033     PT Stop Time: 1050  PT Total Time (min): 17 min     Billable Minutes: Gait Training 8min and Therapeutic Exercise 9min    Treatment Type: Treatment  PT/PTA: PTA     PTA Visit Number: 1     Aziza Armendariz PTA  02/19/2018

## 2018-02-19 NOTE — PROGRESS NOTES
"Progress Note  Hospital Medicine  Patient Name:Lulú Eugene  MRN:  [id number]  Patient Class: IP- Inpatient  Admit Date: 2/16/2018  Length of Stay: 3 days  Expected Discharge Date:   Attending Physician: Irina Alford MD  Primary Care Provider:  Hayes Jean MD    SUBJECTIVE:     Principal Problem: <principal problem not specified>  Initial history of present illness: Patient is a 89 y.o. female admitted to Hospitalist Service from [facility] with complaint of generalized fatigue. Patient reportedly has past medical history significant for hypertension, hyperlipidemia, dementia, Aortic stenosis. Part of the history obtained from daughter. Patient has been under care of [facility] at an assisted living facility. Daughter has revoked hospice. Patient has history of multiple transfusions in the past. Her daughter reports this began a few weeks ago. [facility] tried to transfer her from her bed today and she collapsed. Her daughter noted she was "purple" and placed her on O2. Pt had a similar episode 08/2017. She received a blood transfusion that seemed to alleviate her symptoms. No obvious blood loss or black stools, melena or hematuria reported. Patient denied chest pain, shortness of breath, abdominal pain, nausea, vomiting, headache, vision changes, focal neuro-deficits, cough or fever.    PMH/PSH/SH/FH/Meds: reviewed.    Symptoms/Review of Systems:  Demented, without complaints, looking and feeling better.No shortness of breath, cough, chest pain or headache, fever or abdominal pain.     Diet:  Adequate intake.    Activity level: NUp with assistance   Pain:  Patient reports no pain.       OBJECTIVE:   Vital Signs (Most Recent):      Temp: 97.1 °F (36.2 °C) (02/19/18 0744)  Pulse: 62 (02/19/18 0744)  Resp: 18 (02/19/18 0744)  BP: 127/65 (02/19/18 0744)  SpO2: 97 % (02/19/18 0744)       Vital Signs Range (Last 24H):  Temp:  [97 °F (36.1 °C)-98.1 °F (36.7 °C)]   Pulse:  [57-67] "   Resp:  [14-18]   BP: (114-168)/(61-93)   SpO2:  [94 %-98 %]     Weight: 54.2 kg (119 lb 6.4 oz)  Body mass index is 21.84 kg/m².    Intake/Output Summary (Last 24 hours) at 02/19/18 0904  Last data filed at 02/19/18 0600   Gross per 24 hour   Intake             2140 ml   Output                0 ml   Net             2140 ml     Physical Examination:  General appearance: well developed, appears stated age, elderly frail female  Head: normocephalic, atraumatic  Eyes:  conjunctivae/corneas clear. PERRL.  Nose: Nares normal. Septum midline.  Throat: lips, mucosa, and tongue normal; teeth and gums normal, no throat erythema.  Neck: supple, symmetrical, trachea midline, no JVD and thyroid not enlarged, symmetric, no tenderness/mass/nodules  Lungs:  clear to auscultation bilaterally and normal respiratory effort  Chest wall: no tenderness  Heart: regular rate and rhythm, S1, S2 normal, 4/5 KIMBERLI  Abdomen: soft, non-tender non-distented; bowel sounds normal; no masses,  no organomegaly  Extremities: no cyanosis, clubbing or edema.   Pulses: 2+ and symmetric  Skin: Skin color, texture, turgor normal. No rashes or lesions.  Lymph nodes: Cervical, supraclavicular, and axillary nodes normal.  Neurologic: Grossly non-focal. Nonverbal. + Shrt-term memory problems.    CBC:    Recent Labs  Lab 02/17/18  0552 02/18/18  0559 02/19/18  0458   WBC 7.10 7.10 6.10   RBC 3.71* 3.79* 3.50*   HGB 10.6* 11.0* 10.1*   HCT 31.9* 32.5* 30.7*    182 168   MCV 86 86 88   MCH 28.7 29.0 28.7   MCHC 33.3 33.8 32.8   BMP    Recent Labs  Lab 02/17/18  0552 02/18/18  0559 02/19/18  0458   * 253* 345*    133* 133*   K 4.0 4.4 3.9   CL 99 96 98   CO2 27 29 26   BUN 33* 33* 28   CREATININE 1.3 1.5* 1.7*   CALCIUM 8.5* 9.4 8.8      Diagnostic Results:  Microbiology Results (last 7 days)     Procedure Component Value Units Date/Time    Urine culture [[id number]] Collected:  02/16/18 1603    Order Status:  Completed Specimen:  Urine from  Urine, Catheterized Updated:  02/18/18 0834     Urine Culture, Routine --     PRESUMPTIVE E COLI  >100,000 cfu/ml  Identification and susceptibility pending      Urine culture [[id number]]     Order Status:  Canceled Specimen:  Urine          Chest X-Ray: No acute cardiopulmonary disease    Assessment/Plan:     Severe Symptomatic anemia  Responded well to trasnfusions, H/H stable this morning and no further drops  Iron sats are low, continue iron supplements     UTI with E. Coli sp.  E coli, awaiting Urine C/S. Continue V Rocephin.     Debility - multifactorial (advancing age/dementia/profound hypothyroidism, severe AS/Severe anemia)  Supportive care.  Fall precautions.     Moderate Malnutrition  Nutrition consulted. Encourage maximal PO intake. Diet supplementation ordered per nutrition approval. Will encourage PO and monitor closely for weight changes.         Atrial fibrillation , controlled     Rate controlled, continue metoprolol  [id number] score 4. Anticoagulation not indicated currently due to patient's anemia.          Adult myxedema     Patient with severe hypothyroidism. Increased Levothyroxine 225 mcg po q day                        Depression     Chronic problem, controlled. Will continue chronic medication(s), and monitor for any changes, adjusting as needed.           Nonrheumatic aortic valve stenosis     Noted.        Type 2 diabetes mellitus without complication with hyperglycemia     Check blood glucose level q AC/HS.  Use Novolog Insulin Sliding Scale as needed.   Continue American Diabetic Association 1800 Kcal diet.  Start lantus 25 units sq q day.          Essential hypertension     Chronic medications and monitor for any changes, adjusting as needed.      DVT prophylaxis: Use SCD and TEDs. Hold anticoagulation given anemia on admission     Code Status: DNR - confirmed from daughter. Likely can DC in am with PO abx         VTE Risk Mitigation         Ordered     Medium Risk of VTE  Once       02/16/18 1658     Place YUE hose  Until discontinued      02/16/18 1658        Irina Alford MD  Department of Hospital Medicine   [facility]

## 2018-02-20 VITALS
HEART RATE: 63 BPM | HEIGHT: 62 IN | RESPIRATION RATE: 16 BRPM | TEMPERATURE: 98 F | OXYGEN SATURATION: 96 % | WEIGHT: 119.38 LBS | SYSTOLIC BLOOD PRESSURE: 146 MMHG | DIASTOLIC BLOOD PRESSURE: 70 MMHG | BODY MASS INDEX: 21.97 KG/M2

## 2018-02-20 PROBLEM — R55 SYNCOPE: Status: ACTIVE | Noted: 2018-02-20

## 2018-02-20 LAB
ALBUMIN SERPL BCP-MCNC: 2.9 G/DL
ALP SERPL-CCNC: 49 U/L
ALT SERPL W/O P-5'-P-CCNC: 10 U/L
ANION GAP SERPL CALC-SCNC: 9 MMOL/L
AST SERPL-CCNC: 15 U/L
BASOPHILS # BLD AUTO: 0 K/UL
BASOPHILS NFR BLD: 0.5 %
BILIRUB SERPL-MCNC: 0.4 MG/DL
BUN SERPL-MCNC: 25 MG/DL
CALCIUM SERPL-MCNC: 9.1 MG/DL
CHLORIDE SERPL-SCNC: 101 MMOL/L
CO2 SERPL-SCNC: 27 MMOL/L
CREAT SERPL-MCNC: 1.3 MG/DL
DIFFERENTIAL METHOD: ABNORMAL
EOSINOPHIL # BLD AUTO: 0.2 K/UL
EOSINOPHIL NFR BLD: 2.8 %
ERYTHROCYTE [DISTWIDTH] IN BLOOD BY AUTOMATED COUNT: 18.5 %
EST. GFR  (AFRICAN AMERICAN): 41 ML/MIN/1.73 M^2
EST. GFR  (NON AFRICAN AMERICAN): 35 ML/MIN/1.73 M^2
GLUCOSE SERPL-MCNC: 74 MG/DL
HCT VFR BLD AUTO: 32.9 %
HGB BLD-MCNC: 11 G/DL
LYMPHOCYTES # BLD AUTO: 1.8 K/UL
LYMPHOCYTES NFR BLD: 25.5 %
MCH RBC QN AUTO: 29.2 PG
MCHC RBC AUTO-ENTMCNC: 33.4 G/DL
MCV RBC AUTO: 87 FL
MONOCYTES # BLD AUTO: 0.7 K/UL
MONOCYTES NFR BLD: 10.1 %
NEUTROPHILS # BLD AUTO: 4.2 K/UL
NEUTROPHILS NFR BLD: 61.1 %
PLATELET # BLD AUTO: 180 K/UL
PMV BLD AUTO: 9 FL
POCT GLUCOSE: 184 MG/DL (ref 70–110)
POCT GLUCOSE: 70 MG/DL (ref 70–110)
POTASSIUM SERPL-SCNC: 3.8 MMOL/L
PROT SERPL-MCNC: 6.4 G/DL
RBC # BLD AUTO: 3.77 M/UL
SODIUM SERPL-SCNC: 137 MMOL/L
WBC # BLD AUTO: 6.9 K/UL

## 2018-02-20 PROCEDURE — 99239 HOSP IP/OBS DSCHRG MGMT >30: CPT | Mod: GW,HPC,, | Performed by: INTERNAL MEDICINE

## 2018-02-20 PROCEDURE — 85025 COMPLETE CBC W/AUTO DIFF WBC: CPT

## 2018-02-20 PROCEDURE — 97110 THERAPEUTIC EXERCISES: CPT

## 2018-02-20 PROCEDURE — 25000003 PHARM REV CODE 250: Performed by: INTERNAL MEDICINE

## 2018-02-20 PROCEDURE — 80053 COMPREHEN METABOLIC PANEL: CPT

## 2018-02-20 PROCEDURE — 36415 COLL VENOUS BLD VENIPUNCTURE: CPT

## 2018-02-20 PROCEDURE — 97116 GAIT TRAINING THERAPY: CPT

## 2018-02-20 RX ADMIN — CHOLECALCIFEROL TAB 25 MCG (1000 UNIT) 1000 UNITS: 25 TAB at 10:02

## 2018-02-20 RX ADMIN — LEVOTHYROXINE SODIUM 225 MCG: 125 TABLET ORAL at 05:02

## 2018-02-20 RX ADMIN — HYDRALAZINE HYDROCHLORIDE 25 MG: 25 TABLET, FILM COATED ORAL at 01:02

## 2018-02-20 RX ADMIN — METOPROLOL TARTRATE 50 MG: 50 TABLET ORAL at 10:02

## 2018-02-20 RX ADMIN — FERROUS SULFATE TAB EC 325 MG (65 MG FE EQUIVALENT) 325 MG: 325 (65 FE) TABLET DELAYED RESPONSE at 10:02

## 2018-02-20 RX ADMIN — LOSARTAN POTASSIUM 100 MG: 25 TABLET, FILM COATED ORAL at 10:02

## 2018-02-20 RX ADMIN — HYDRALAZINE HYDROCHLORIDE 25 MG: 25 TABLET, FILM COATED ORAL at 05:02

## 2018-02-20 RX ADMIN — CITALOPRAM HYDROBROMIDE 10 MG: 10 TABLET ORAL at 10:02

## 2018-02-20 RX ADMIN — PANTOPRAZOLE SODIUM 40 MG: 40 TABLET, DELAYED RELEASE ORAL at 10:02

## 2018-02-20 RX ADMIN — ASPIRIN 81 MG CHEWABLE TABLET 81 MG: 81 TABLET CHEWABLE at 10:02

## 2018-02-20 NOTE — PLAN OF CARE
Problem: Patient Care Overview  Goal: Plan of Care Review  Pt discharged back to [place].  Daughter at bedside for transport back to [place].  Iv cath removed and site covered.  Pt spent majority of day at nurses station to keep calm.  S/c Evelyn with telesitter, informed of d/c.  Pt remained free from injury.  Walking with walker with out difficulty.  Discharge paper work discussed with daughter who verbalized understanding.

## 2018-02-20 NOTE — PLAN OF CARE
Problem: Patient Care Overview  Goal: Plan of Care Review  Pt with dementia.  Calm most of day.  avasys in use.  Incontinent at times but also able to ambulate with assistance to bathroom.  Ambulated with therapy and walker.  Remained free from injury.  Rest periods through out the day provided.  Tolerated ice cream this afternoon blood sugar elevated after and covered with sliding scale insulin.  oob to recliner and at nurses station today to redirect and successful.  Daniele/scds in use.  anticoags on hold d/t anemia on admit.  Tele in use.  NSR with occasional PVCs.  Possible d/c 2/20/18.  Awaiting cultures.  Frequent rounding completed.

## 2018-02-20 NOTE — PLAN OF CARE
Problem: Patient Care Overview  Goal: Plan of Care Review  Outcome: Ongoing (interventions implemented as appropriate)  Patient has dementia.  Patient had a uneventful night. Avasys in place. Room near nurses station. BG monitoring during shift, SSI needed. Tele intact. TEDs and SCDs in place. Patient tolerating a diabetic diet well. No complaints of N/V.  PIV intact. Patient incontinent at times but does get up with walker and assistance to the restroom . Hourly rounding on patient to promote safety. Safety maintained throughout the shift. Patient positions and repositions self  independently. No new skin break down.   Bed locked and in lowest position. Call light in reach. Side rails up x2. NON skid socks on when OOB. Patient remained free of falls/ trauma.  Will continue to monitor.

## 2018-02-20 NOTE — PROGRESS NOTES
I sent a 3 day packet, discharge orders and AVS to [facility] via [facility]. Margaux Sherwood LMSW     I spoke to Thania with [facility] [phone]. She stated that they are all set up with hospice services and she will call the pts daughter to let her know the pt is discharging today. She confirmed receipt of the discharge orders.  She asked if the pt would be transporting via ambulance however the pt has walked 200 ft with a RW with PT so she will not qualify for ambulance transport.     [facility] [phone]- I updated Marcie that their resident is returning today. Discharge orders faxed to [phone].  Fax would not go through so I gave them to Ms. Monteiro to bring to [facility]. She is meeting the family there after discharge .     The pt will discharge home via family. Margaux Sherwood LMSW

## 2018-02-20 NOTE — PLAN OF CARE
Problem: Physical Therapy Goal  Goal: Physical Therapy Goal  1.Pt will be independent with bed mobility and transfers.  2.Pt will ambulate 250'+with AD with CGA.   Outcome: Ongoing (interventions implemented as appropriate)  Ambulated 240' x 2 with rw and CGA.

## 2018-02-20 NOTE — PT/OT/SLP PROGRESS
Physical Therapy Treatment    Patient Name:  Lulú Eugene   MRN:  [id number]    Recommendations:     Discharge Recommendations:  nursing facility, skilled   Discharge Equipment Recommendations:     Barriers to discharge: None    Assessment:     Lulú Eugene is a 89 y.o. female admitted with a medical diagnosis of Syncope.  She presents with the following impairments/functional limitations:  weakness, impaired endurance, gait instability, impaired functional mobilty, impaired cognition .    Rehab Prognosis:  good; patient would benefit from acute skilled PT services to address these deficits and reach maximum level of function.      Recent Surgery: * No surgery found *      Plan:     During this hospitalization, patient to be seen 6 x/week to address the above listed problems via gait training, therapeutic activities, therapeutic exercises  · Plan of Care Expires:  02/26/18   Plan of Care Reviewed with: patient    Subjective     Communicated with nurse Lynch prior to session.  Patient found seated in chair at nurse station upon PT entry to room, agreeable to treatment.      Chief Complaint: bored, no one to talk to in room  Patient comments/goals: none stated  Pain/Comfort:  · Pain Rating 1: 0/10    Patients cultural, spiritual, Jew conflicts given the current situation:      Objective:     Patient found with: telemetry     General Precautions: Standard,     Orthopedic Precautions:N/A   Braces: N/A     Functional Mobility:  · Transfers:     · Sit to Stand:  stand by assistance with rolling walker  · Gait: 240' x 2 with rw and CGA. Seated rest between each.      AM-PAC 6 CLICK MOBILITY          Therapeutic Activities and Exercises:   LE  exercises at 10 reps each ; TKE's, Hip abd/add/; flexion, AP's, marches. Returned to sitting in chair at nurses station.    Patient left up in chair with all lines intact, call button in reach and nurse present present..    GOALS:    Physical Therapy Goals        Problem:  Physical Therapy Goal           Problem: Physical Therapy Goal    Goal Priority Disciplines Outcome Goal Variances Interventions   Physical Therapy Goal     PT/OT, PT Ongoing (interventions implemented as appropriate)     Description:  1.Pt will be independent with bed mobility and transfers.  2.Pt will ambulate 250'+with AD with CGA.                    Time Tracking:     PT Received On: 02/20/18  PT Start Time: 1025     PT Stop Time: 1041  PT Total Time (min): 16 min     Billable Minutes: Gait Training 8min and Therapeutic Exercise 8min    Treatment Type: Treatment  PT/PTA: PTA     PTA Visit Number: 2     Aziza Armendariz PTA  02/20/2018

## 2018-02-20 NOTE — DISCHARGE SUMMARY
"Discharge Summary  Hospital Medicine    Admit Date: 2/16/2018    Date and Time: 2/20/201810:31 AM    Discharge Attending Physician: Irina Alford MD    Primary Care Physician: Hayes Jean MD    Diagnoses:  Active Hospital Problems    Diagnosis  POA    *Syncope [R55]  Yes    Urinary tract infection without hematuria [N39.0]  Unknown    Anemia [D64.9]  Yes    Depression [F32.9]  Yes    Essential hypertension [I10]  Yes    Type 2 diabetes mellitus without complication [E11.9]  Yes      Resolved Hospital Problems    Diagnosis Date Resolved POA   No resolved problems to display.     Discharged Condition: Good    Hospital Course:   Patient is a 89 y.o. female admitted to Hospitalist Service from [facility] with complaint of generalized fatigue. Patient reportedly has past medical history significant for hypertension, hyperlipidemia, dementia, Aortic stenosis. Part of the history obtained from daughter. Patient has been under care of [facility] at an assisted living facility. Daughter revoked hospice. Patient has history of multiple transfusions in the past. Her daughter reports this began a few weeks ago. [facility] tried to transfer her from her bed today and she collapsed. Her daughter noted she was "purple" and placed her on O2. Pt had a similar episode 08/2017. She received a blood transfusion that seemed to alleviate her symptoms. No obvious blood loss or black stools, melena or hematuria reported. Patient denied chest pain, shortness of breath, abdominal pain, nausea, vomiting, headache, vision changes, focal neuro-deficits, cough or fever. Patient was admitted to Hospitalist medicine service. Patient was note dto have symptomatic anemia for which she received PRBC. Patient also treated for UTI with antibiotic therapy. Symptoms improved. Patient was discharged home with hospice in stable condition with following discharge plan of care. Total time with the patient was 30 " minutes and greater than 50% was spent in counseling and coordination of care. The assessment and plan have been discussed at length. Physicians' notes reviewed. Labs and procedure reviewed.     Consults: None    Significant Diagnostic Studies:   Chest X-Ray: No acute cardiopulmonary disease    Microbiology Results (last 7 days)     Procedure Component Value Units Date/Time    Urine culture [[id number]]  (Susceptibility) Collected:  02/16/18 1603    Order Status:  Completed Specimen:  Urine from Urine, Catheterized Updated:  02/19/18 1306     Urine Culture, Routine --     ESCHERICHIA COLI  >100,000 cfu/ml      Urine culture [[id number]]     Order Status:  Canceled Specimen:  Urine         Special Treatments/Procedures: None  Disposition: Home with [facility]    Medications:  Reconciled Home Medications: Current Discharge Medication List      CONTINUE these medications which have NOT CHANGED    Details   aspirin 81 MG Chew Take 81 mg by mouth once daily.      b complex vitamins tablet Take 1 tablet by mouth once daily.      citalopram (CELEXA) 10 MG tablet Take 10 mg by mouth once daily.      doxycycline (VIBRAMYCIN) 100 MG Cap Take 100 mg by mouth every other day.      ferrous sulfate 325 mg (65 mg iron) Tab tablet Take 325 mg by mouth daily with breakfast.      furosemide (LASIX) 20 MG tablet Take 20 mg by mouth every other day.      hydrALAZINE (APRESOLINE) 25 MG tablet Take 25 mg by mouth 3 (three) times daily.      insulin glargine (LANTUS) 100 unit/mL injection Inject 25 Units into the skin every evening.       levothyroxine (SYNTHROID, LEVOTHROID) 175 MCG tablet Take 1 tablet (175 mcg total) by mouth once daily.  Qty: 30 tablet, Refills: 0      losartan (COZAAR) 100 MG tablet Take 100 mg by mouth once daily.      metoprolol tartrate (LOPRESSOR) 50 MG tablet Take 50 mg by mouth 2 (two) times daily.      naftifine (NAFTIN) 1 % cream Apply topically every evening. Apply to feet      omeprazole (PRILOSEC) 40 MG  capsule Take 40 mg by mouth once daily.      simvastatin (ZOCOR) 20 MG tablet Take 20 mg by mouth every evening.      vitamin D 1000 units Tab Take 1,000 Units by mouth once daily.      hydrocodone-acetaminophen 5-325mg (NORCO) 5-325 mg per tablet Take 1 tablet by mouth every 6 (six) hours as needed for Pain.      hydrocortisone-pramoxine (PROCTOFOAM-HS) rectal foam Place 1 applicator rectally 4 (four) times daily.      ondansetron (ZOFRAN) 4 MG tablet Take 4 mg by mouth every 8 (eight) hours as needed for Nausea.      oxycodone (ROXICODONE) 5 MG immediate release tablet Take 5 mg by mouth every 4 (four) hours as needed for Pain.             Discharge Procedure Orders  Diet Cardiac     Activity as tolerated   Order Comments: Observe fall precautions.     Call MD for:   Order Comments: For worsening symptoms, chest pain, shortness of breath, increased abdominal pain, high grade fever, stroke or stroke like symptoms, immediately go to the nearest Emergency Room or call 911 as soon as possible.       Follow-up Information     Hayes Jean MD In 1 week.    Specialty:  Internal Medicine  Contact information:  [address] [facility] [address]  [facility] [address]  [phone]

## 2018-02-21 NOTE — PLAN OF CARE
02/21/18 0800   Final Note   Assessment Type Final Discharge Note   [facility]

## 2018-02-21 NOTE — PHYSICIAN QUERY
PT Name: Lulú Eugene  MR #: [id number]     Physician Query Form - Documentation Clarification      CDS/: Mili Blackman RN                 Contact information:[email or web address]    This form is a permanent document in the medical record.     Query Date: February 21, 2018    By submitting this query, we are merely seeking further clarification of documentation. Please utilize your independent clinical judgment when addressing the question(s) below.    The Medical record reflects the following:    Supporting Clinical Findings Location in Medical Record   Severe Symptomatic anemia  Transfuse 3 units of PRBC. Follow BMP. Give IV lasix after 2nd and 3rd PRBC.  Check Iron, TIBC, B12, Folate and FOBT.    Responded well to trasnfusions, H/H stable this morning and no further drop  Iron sats are low, continue iron supplements   Hosp Med H&P 2/16          Hosp Med H&P 2/19   Ferrous sulfate 325 mg daily with breakfast    Hgb 5.2>>11.0  Hct 16.5>>32.9    3 units pRBC   MAR    Lab 2/16-2/20      Blood bank 2/16                                                                            Doctor, Please specify diagnosis or diagnoses associated with above clinical findings.    Provider Use Only      ___ chronic iron deficiency anemia    _x__acute on chronic iron deficiency anemia    ___ acute blood loss anemia, source___________________________    ____chronic blood loss anemia, source_________________________    ____acute on chronic blood loss anemia, source_________________    ____other________________________                                                                                                               [  ] Clinically undetermined

## 2018-02-21 NOTE — PHYSICIAN QUERY
PT Name: Lulú Eugene  MR #: [id number]    Physician Query Form - Atrial Fibrillation Specificity     CDS/: Mili Blackman RN                 Contact information:[email or web address]     This form is a permanent document in the medical record.     Query Date: February 21, 2018    By submitting this query, we are merely seeking further clarification of documentation. Please utilize your independent clinical judgment when addressing the question(s) below.    The medical record contains the following:   Indicators     Supporting Clinical Findings Location in Medical Record   x Atrial Fibrillation Atrial fibrillation , controlled  Rate controlled, continue metoprolol  [id number] score 4. Anticoagulation not indicated currently due to patient's anemia. Hosp Med PN 2/18    EKG results     x Medication Metoprolol tartrate 50 mg po twice daily MAR    Treatment      Other         Provider, please further specify the Atrial Fibrillation diagnosis.    [x  ] Chronic  [  ] Paroxysmal  [  ] Permanent  [  ] Persistent  [  ] Other (please specify): ____________________________  [  ] Clinically Undetermined    Please document in your progress notes daily for the duration of treatment until resolved, and include in your discharge summary.

## 2018-06-23 NOTE — PLAN OF CARE
2:45 PM. On call SW received page pt to resume with [facility] today. Pts daughter Mayra [phone] notes pt lives at [facility]; where hospice provides their services. AKILA placed call to [facility] in inform them of pts discharge for today, speaking with on call rep Gracie  [phone] questioning if they will need updated orders. SW to update nurse Paula [phone] soon.     Mayra notes she will provide transportation for pt upon discharge.    3:08 PM.Due to pts inpatient status pts daughter will need to sign legals in order to reactivate services. AKILA updated pts nurse Paula. AKILA to fax hospice orders once available to [phone]. pts daughter mayra updated and aware. [facility] granted epic access.    Orders faxed,  to contact pts daughter to sign paperwork    4:44 PM. Scarlett with [facility] reports at the hospital now to sign paperwork. Pts daughter to provide transportation home. Pt is cleared to discharge from the case management stance.       ELDER Jimenez, JUDAH  [phone]     3